# Patient Record
Sex: FEMALE | Race: WHITE | NOT HISPANIC OR LATINO | Employment: FULL TIME | URBAN - NONMETROPOLITAN AREA
[De-identification: names, ages, dates, MRNs, and addresses within clinical notes are randomized per-mention and may not be internally consistent; named-entity substitution may affect disease eponyms.]

---

## 2017-02-20 ENCOUNTER — OFFICE VISIT (OUTPATIENT)
Dept: URGENT CARE | Facility: PHYSICIAN GROUP | Age: 57
End: 2017-02-20
Payer: COMMERCIAL

## 2017-02-20 VITALS
DIASTOLIC BLOOD PRESSURE: 90 MMHG | HEIGHT: 65 IN | TEMPERATURE: 100.2 F | RESPIRATION RATE: 16 BRPM | SYSTOLIC BLOOD PRESSURE: 146 MMHG | HEART RATE: 88 BPM | WEIGHT: 270 LBS | BODY MASS INDEX: 44.98 KG/M2 | OXYGEN SATURATION: 95 %
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
## 2017-02-20 DIAGNOSIS — L03.818 CELLULITIS OF OTHER SPECIFIED SITE: ICD-10-CM

## 2017-02-20 DIAGNOSIS — S60.561A BUG BITE OF HAND, RIGHT, INITIAL ENCOUNTER: ICD-10-CM

## 2017-02-20 PROCEDURE — 99203 OFFICE O/P NEW LOW 30 MIN: CPT | Mod: 25 | Performed by: PHYSICIAN ASSISTANT

## 2017-02-20 PROCEDURE — G8419 CALC BMI OUT NRM PARAM NOF/U: HCPCS | Performed by: PHYSICIAN ASSISTANT

## 2017-02-20 PROCEDURE — G8484 FLU IMMUNIZE NO ADMIN: HCPCS | Performed by: PHYSICIAN ASSISTANT

## 2017-02-20 PROCEDURE — 1036F TOBACCO NON-USER: CPT | Performed by: PHYSICIAN ASSISTANT

## 2017-02-20 PROCEDURE — 3017F COLORECTAL CA SCREEN DOC REV: CPT | Mod: 8P | Performed by: PHYSICIAN ASSISTANT

## 2017-02-20 PROCEDURE — 3014F SCREEN MAMMO DOC REV: CPT | Mod: 8P | Performed by: PHYSICIAN ASSISTANT

## 2017-02-20 PROCEDURE — G8432 DEP SCR NOT DOC, RNG: HCPCS | Performed by: PHYSICIAN ASSISTANT

## 2017-02-20 RX ORDER — CEFTRIAXONE 1 G/1
1 INJECTION, POWDER, FOR SOLUTION INTRAMUSCULAR; INTRAVENOUS ONCE
Status: COMPLETED | OUTPATIENT
Start: 2017-02-20 | End: 2017-02-20

## 2017-02-20 RX ORDER — PAROXETINE HYDROCHLORIDE 20 MG/1
20 TABLET, FILM COATED ORAL DAILY
Status: ON HOLD | COMMUNITY
End: 2020-10-03

## 2017-02-20 RX ORDER — CEPHALEXIN 500 MG/1
500 CAPSULE ORAL 3 TIMES DAILY
Qty: 30 CAP | Refills: 0 | Status: SHIPPED | OUTPATIENT
Start: 2017-02-20 | End: 2017-03-02

## 2017-02-20 RX ADMIN — CEFTRIAXONE 1 G: 1 INJECTION, POWDER, FOR SOLUTION INTRAMUSCULAR; INTRAVENOUS at 18:18

## 2017-02-20 ASSESSMENT — ENCOUNTER SYMPTOMS
VOMITING: 0
FEVER: 0
CHANGE IN BOWEL HABIT: 0
EYE REDNESS: 0
DIZZINESS: 0
COUGH: 0
EYE DISCHARGE: 0
FALLS: 0
CHILLS: 0
MYALGIAS: 1
SORE THROAT: 0
NECK PAIN: 0
BACK PAIN: 0
ABDOMINAL PAIN: 0
VISUAL CHANGE: 0
TINGLING: 0

## 2017-02-20 NOTE — MR AVS SNAPSHOT
"        Terrie Scott   2017 5:45 PM   Office Visit   MRN: [id number]    Department:  [facility]   Dept Phone:  [phone]    Description:  Female : 1960   Provider:  Eladio Coello PA-C           Reason for Visit     Insect Bite R hand swollen X 2 days      Allergies as of 2017     No Known Allergies      You were diagnosed with     Bug bite of hand, right, initial encounter   [212909]       Cellulitis of other specified site   [0561774]         Vital Signs     Blood Pressure Pulse Temperature Respirations Height Weight    146/90 mmHg 88 37.9 °C (100.2 °F) 16 1.651 m (5' 5\") 122.471 kg (270 lb)    Body Mass Index Oxygen Saturation Breastfeeding? Smoking Status          44.93 kg/m2 95% No Never Smoker         Basic Information     Date Of Birth Sex Race Ethnicity Preferred Language    1960 Female Unable to Obtain Non- English      Problem List              ICD-10-CM Priority Class Noted - Resolved    HTN (hypertension) I10   2012 - Present    Fibromyalgia M79.7   2012 - Present    Arthritis M19.90   2012 - Present    Chronic fatigue disorder R53.82   2012 - Present      Health Maintenance        Date Due Completion Dates    IMM DTaP/Tdap/Td Vaccine (1 - Tdap) 1978 ---    PAP SMEAR 1980 ---    MAMMOGRAM 1999 ---    COLONOSCOPY 2009 ---    IMM INFLUENZA (1) 2016 ---            Current Immunizations     No immunizations on file.      Below and/or attached are the medications your provider expects you to take. Review all of your home medications and newly ordered medications with your provider and/or pharmacist. Follow medication instructions as directed by your provider and/or pharmacist. Please keep your medication list with you and share with your provider. Update the information when medications are discontinued, doses are changed, or new medications (including over-the-counter products) are added; and carry medication information " at all times in the event of emergency situations     Allergies:  No Known Allergies          Medications  Valid as of: February 20, 2017 -  6:32 PM    Generic Name Brand Name Tablet Size Instructions for use    Atenolol (Tab) TENORMIN 100 MG Take 100 mg by mouth every day.          Cephalexin (Cap) KEFLEX 500 MG Take 1 Cap by mouth 3 times a day for 10 days.        Cetirizine HCl (Tab) ZYRTEC 10 MG Take 1 Tab by mouth every day.        Citalopram Hydrobromide (Tab) CELEXA 20 MG Take 20 mg by mouth every day.          Famotidine (Tab) PEPCID 40 MG Take 1 Tab by mouth 2 times a day.        Hydrocodone-Acetaminophen (Tab) VICODIN 5-500 MG Take 1 Tab by mouth every 6 hours as needed.        Ketoconazole (Cream) NIZORAL 2 % To hand rash for 4 weeks        Losartan Potassium-HCTZ (Tab) HYZAAR 100-25 MG Take 1 Tab by mouth every day.          PARoxetine HCl (Tab) PAXIL 20 MG Take 20 mg by mouth every day.        .                 Medicines prescribed today were sent to:     [facility] - [facility] [address] [address]    Phone: [phone] Fax: [phone]    Open 24 Hours?: No    [facility] [address] [address]  AT [address] [address]    Phone: [phone] Fax: [phone]    Open 24 Hours?: No      Medication refill instructions:       If your prescription bottle indicates you have medication refills left, it is not necessary to call your provider’s office. Please contact your pharmacy and they will refill your medication.    If your prescription bottle indicates you do not have any refills left, you may request refills at any time through one of the following ways: The online SoundSen[employer]ation system (except Urgent Care), by calling your provider’s office, or by asking your pharmacy to contact your provider’s office with a refill request. Medication refills are processed only during regular business hours  and may not be available until the next business day. Your provider may request additional information or to have a follow-up visit with you prior to refilling your medication.   *Please Note: Medication refills are assigned a new Rx number when refilled electronically. Your pharmacy may indicate that no refills were authorized even though a new prescription for the same medication is available at the pharmacy. Please request the medicine by name with the pharmacy before contacting your provider for a refill.           ScholarPRO Access Code: [email or web address]  Expires: 3/22/2017  6:32 PM    Your email address is not on file at [facility].  Email Addresses are required for you to sign up for ScholarPRO, please contact [phone] to verify your personal information and to provide your email address prior to attempting to register for ScholarPRO.    Terrie Scott  [address]  [place] [address]    ScholarPRO  A secure, online tool to manage your health information     [facility]’s ScholarPRO® is a secure, online tool that connects you to your personalized health information from the privacy of your home -- day or night - making it very easy for you to manage your healthcare. Once the activation process is completed, you can even access your medical information using the ScholarPRO kishore, which is available for free in the Apple Kishore store or Google Play store.     To learn more about ScholarPRO, visit [email or web address]    There are two levels of access available (as shown below):   My Chart Features  [facility] Primary Care Doctor [facility]  Specialists [facility]  Urgent  Care Non-[facility] Primary Care Doctor   Email your healthcare team securely and privately 24/7 X X X    Manage appointments: schedule your next appointment; view details of past/upcoming appointments X      Request prescription refills. X      View recent personal medical records, including lab and immunizations X X X X   View health record, including health  history, allergies, medications X X X X   Read reports about your outpatient visits, procedures, consult and ER notes X X X X   See your discharge summary, which is a recap of your hospital and/or ER visit that includes your diagnosis, lab results, and care plan X X  X     How to register for Better Walk:  Once your e-mail address has been verified, follow the following steps to sign up for Better Walk.     1. Go to  [email or web address]  2. Click on the Sign Up Now box, which takes you to the New Member Sign Up page. You will need to provide the following information:  a. Enter your Better Walk Access Code exactly as it appears at the top of this page. (You will not need to use this code after you’ve completed the sign-up process. If you do not sign up before the expiration date, you must request a new code.)   b. Enter your date of birth.   c. Enter your home email address.   d. Click Submit, and follow the next screen’s instructions.  3. Create a [employer] Collection Systemst ID. This will be your Better Walk login ID and cannot be changed, so think of one that is secure and easy to remember.  4. Create a Better Walk password. You can change your password at any time.  5. Enter your Password Reset Question and Answer. This can be used at a later time if you forget your password.   6. Enter your e-mail address. This allows you to receive e-mail notifications when new information is available in Better Walk.  7. Click Sign Up. You can now view your health information.    For assistance activating your Better Walk account, call [phone]

## 2017-02-21 NOTE — PROGRESS NOTES
"Subjective:      Terrie Scott is a 57 y.o. female who presents with Insect Bite          Pt is 56 y/o female who presents with a possible bug bite to the 3rd digit of her right hand that she noticed this morning. She admits that she can't think of anything that might of bit her however she presumes it was a bug. He has a cat at home- however the cat is de-clawed and didn't bite her.   She denies fevers, chills. She reports hx of MS- and she always feels fatigued- however no new symptoms.   Animal Bite  This is a new problem. The current episode started today. The problem occurs constantly. The problem has been gradually worsening. Associated symptoms include myalgias. Pertinent negatives include no abdominal pain, change in bowel habit, chest pain, chills, coughing, fever, neck pain, rash, sore throat, urinary symptoms, visual change or vomiting. Exacerbated by: Movement. Treatments tried: Ice. Motrin. The treatment provided mild relief.       Review of Systems   Constitutional: Positive for malaise/fatigue. Negative for fever and chills.   HENT: Negative for ear discharge, ear pain and sore throat.    Eyes: Negative for discharge and redness.   Respiratory: Negative for cough.    Cardiovascular: Negative for chest pain and leg swelling.   Gastrointestinal: Negative for vomiting, abdominal pain and change in bowel habit.   Musculoskeletal: Positive for myalgias and joint pain. Negative for back pain, falls and neck pain.   Skin: Negative for itching and rash.   Neurological: Negative for dizziness and tingling.          Objective:     /90 mmHg  Pulse 88  Temp(Src) 37.9 °C (100.2 °F)  Resp 16  Ht 1.651 m (5' 5\")  Wt 122.471 kg (270 lb)  BMI 44.93 kg/m2  SpO2 95%  Breastfeeding? No   PMH:  has a past medical history of HTN (hypertension) (11/6/2012).  MEDS:   Current outpatient prescriptions:   •  paroxetine (PAXIL) 20 MG Tab, Take 20 mg by mouth every day., Disp: , Rfl:   •  cephALEXin (KEFLEX) " 500 MG Cap, Take 1 Cap by mouth 3 times a day for 10 days., Disp: 30 Cap, Rfl: 0  •  cetirizine (ZYRTEC) 10 MG TABS, Take 1 Tab by mouth every day., Disp: 30 Tab, Rfl: 3  •  losartan-hydrochlorothiazide (HYZAAR) 100-25 MG per tablet, Take 1 Tab by mouth every day.  , Disp: , Rfl:   •  famotidine (PEPCID) 40 MG TABS, Take 1 Tab by mouth 2 times a day., Disp: 180 Each, Rfl: 1  •  ketoconazole (NIZORAL) 2 % CREA, To hand rash for 4 weeks, Disp: 1 Tube, Rfl: 0  •  atenolol (TENORMIN) 100 MG TABS, Take 100 mg by mouth every day.  , Disp: , Rfl:   •  citalopram (CELEXA) 20 MG TABS, Take 20 mg by mouth every day.  , Disp: , Rfl:   •  hydrocodone-acetaminophen (VICODIN) 5-500 MG TABS, Take 1 Tab by mouth every 6 hours as needed., Disp: 120 Each, Rfl: 1  ALLERGIES: No Known Allergies  SURGHX: History reviewed. No pertinent past surgical history.  SOCHX:  reports that she has never smoked. She does not have any smokeless tobacco history on file. She reports that she does not drink alcohol or use illicit drugs.  FH: Family history was reviewed, no pertinent findings to report    Physical Exam   Constitutional: She is oriented to person, place, and time. She appears well-developed and well-nourished.   HENT:   Head: Normocephalic and atraumatic.   Eyes: EOM are normal. Pupils are equal, round, and reactive to light.   Neck: Normal range of motion. Neck supple.   Cardiovascular: Normal rate and regular rhythm.    Pulmonary/Chest: Effort normal and breath sounds normal.   Neurological: She is alert and oriented to person, place, and time.   Skin:        Just inferior to the PIP joint of the 3rd right digit- dorsum aspect of the hand- noted small papule- with surrounding edema, and erythema- extending down the hand- this was slightly warm to touch and tender. Slight decreased ROM with Flexion of the finger- without evidence of fluctuance or evidence of abscess formation. Without evidence of skin necrosis or FB.      Vitals  reviewed.              Assessment/Plan:     1. Bug bite of hand, right, initial encounter  - cefTRIAXone (ROCEPHIN) injection 1 g; 1,000 mg by Intramuscular route Once.  - cephALEXin (KEFLEX) 500 MG Cap; Take 1 Cap by mouth 3 times a day for 10 days.  Dispense: 30 Cap; Refill: 0    2. Cellulitis of other specified site  - cefTRIAXone (ROCEPHIN) injection 1 g; 1,000 mg by Intramuscular route Once.  - cephALEXin (KEFLEX) 500 MG Cap; Take 1 Cap by mouth 3 times a day for 10 days.  Dispense: 30 Cap; Refill: 0    Area was marked- for the pt. To have symptoms occur and worsening so quickly- STRICT ER precautions were discussed.   Use ice for swelling as tolerated- Wound check in 2 days.   Patient given precautionary s/sx that mandate immediate follow up and evaluation in the ED. Advised of risks of not doing so.    DDX, Supportive care, and indications for immediate follow-up discussed with patient.    Instructed to return to clinic or nearest emergency department if we are not available for any change in condition, further concerns, or worsening of symptoms.    The patient demonstrated a good understanding and agreed with the treatment plan.

## 2019-12-27 ENCOUNTER — HOSPITAL ENCOUNTER (OUTPATIENT)
Facility: MEDICAL CENTER | Age: 59
End: 2019-12-27
Attending: ORTHOPAEDIC SURGERY | Admitting: ORTHOPAEDIC SURGERY
Payer: COMMERCIAL

## 2020-10-02 ENCOUNTER — HOSPITAL ENCOUNTER (INPATIENT)
Facility: MEDICAL CENTER | Age: 60
LOS: 3 days | DRG: 418 | End: 2020-10-06
Attending: EMERGENCY MEDICINE | Admitting: HOSPITALIST
Payer: COMMERCIAL

## 2020-10-02 ENCOUNTER — APPOINTMENT (OUTPATIENT)
Dept: RADIOLOGY | Facility: MEDICAL CENTER | Age: 60
DRG: 418 | End: 2020-10-02
Attending: EMERGENCY MEDICINE
Payer: COMMERCIAL

## 2020-10-02 LAB
ALBUMIN SERPL BCP-MCNC: 4 G/DL (ref 3.2–4.9)
ALBUMIN/GLOB SERPL: 1.3 G/DL
ALP SERPL-CCNC: 83 U/L (ref 30–99)
ALT SERPL-CCNC: 19 U/L (ref 2–50)
ANION GAP SERPL CALC-SCNC: 12 MMOL/L (ref 7–16)
AST SERPL-CCNC: 25 U/L (ref 12–45)
BASOPHILS # BLD AUTO: 1 % (ref 0–1.8)
BASOPHILS # BLD: 0.07 K/UL (ref 0–0.12)
BILIRUB SERPL-MCNC: 0.3 MG/DL (ref 0.1–1.5)
BUN SERPL-MCNC: 14 MG/DL (ref 8–22)
CALCIUM SERPL-MCNC: 9.1 MG/DL (ref 8.5–10.5)
CHLORIDE SERPL-SCNC: 101 MMOL/L (ref 96–112)
CO2 SERPL-SCNC: 26 MMOL/L (ref 20–33)
COVID ORDER STATUS COVID19: NORMAL
CREAT SERPL-MCNC: 1.11 MG/DL (ref 0.5–1.4)
EOSINOPHIL # BLD AUTO: 0.28 K/UL (ref 0–0.51)
EOSINOPHIL NFR BLD: 4.2 % (ref 0–6.9)
ERYTHROCYTE [DISTWIDTH] IN BLOOD BY AUTOMATED COUNT: 44.4 FL (ref 35.9–50)
GLOBULIN SER CALC-MCNC: 3.1 G/DL (ref 1.9–3.5)
GLUCOSE SERPL-MCNC: 104 MG/DL (ref 65–99)
HCT VFR BLD AUTO: 41.5 % (ref 37–47)
HGB BLD-MCNC: 13.4 G/DL (ref 12–16)
IMM GRANULOCYTES # BLD AUTO: 0.01 K/UL (ref 0–0.11)
IMM GRANULOCYTES NFR BLD AUTO: 0.1 % (ref 0–0.9)
LIPASE SERPL-CCNC: 461 U/L (ref 11–82)
LYMPHOCYTES # BLD AUTO: 2.78 K/UL (ref 1–4.8)
LYMPHOCYTES NFR BLD: 41.6 % (ref 22–41)
MCH RBC QN AUTO: 29.5 PG (ref 27–33)
MCHC RBC AUTO-ENTMCNC: 32.3 G/DL (ref 33.6–35)
MCV RBC AUTO: 91.2 FL (ref 81.4–97.8)
MONOCYTES # BLD AUTO: 0.71 K/UL (ref 0–0.85)
MONOCYTES NFR BLD AUTO: 10.6 % (ref 0–13.4)
NEUTROPHILS # BLD AUTO: 2.84 K/UL (ref 2–7.15)
NEUTROPHILS NFR BLD: 42.5 % (ref 44–72)
NRBC # BLD AUTO: 0 K/UL
NRBC BLD-RTO: 0 /100 WBC
PLATELET # BLD AUTO: 159 K/UL (ref 164–446)
PMV BLD AUTO: 10.5 FL (ref 9–12.9)
POTASSIUM SERPL-SCNC: 3.9 MMOL/L (ref 3.6–5.5)
PROT SERPL-MCNC: 7.1 G/DL (ref 6–8.2)
RBC # BLD AUTO: 4.55 M/UL (ref 4.2–5.4)
SODIUM SERPL-SCNC: 139 MMOL/L (ref 135–145)
WBC # BLD AUTO: 6.7 K/UL (ref 4.8–10.8)

## 2020-10-02 PROCEDURE — 83690 ASSAY OF LIPASE: CPT

## 2020-10-02 PROCEDURE — 76705 ECHO EXAM OF ABDOMEN: CPT

## 2020-10-02 PROCEDURE — 85025 COMPLETE CBC W/AUTO DIFF WBC: CPT

## 2020-10-02 PROCEDURE — 36415 COLL VENOUS BLD VENIPUNCTURE: CPT

## 2020-10-02 PROCEDURE — 700105 HCHG RX REV CODE 258: Performed by: EMERGENCY MEDICINE

## 2020-10-02 PROCEDURE — 80053 COMPREHEN METABOLIC PANEL: CPT

## 2020-10-02 PROCEDURE — 96374 THER/PROPH/DIAG INJ IV PUSH: CPT

## 2020-10-02 PROCEDURE — 700111 HCHG RX REV CODE 636 W/ 250 OVERRIDE (IP): Performed by: EMERGENCY MEDICINE

## 2020-10-02 PROCEDURE — 96375 TX/PRO/DX INJ NEW DRUG ADDON: CPT

## 2020-10-02 PROCEDURE — U0003 INFECTIOUS AGENT DETECTION BY NUCLEIC ACID (DNA OR RNA); SEVERE ACUTE RESPIRATORY SYNDROME CORONAVIRUS 2 (SARS-COV-2) (CORONAVIRUS DISEASE [COVID-19]), AMPLIFIED PROBE TECHNIQUE, MAKING USE OF HIGH THROUGHPUT TECHNOLOGIES AS DESCRIBED BY CMS-2020-01-R: HCPCS

## 2020-10-02 PROCEDURE — 99285 EMERGENCY DEPT VISIT HI MDM: CPT

## 2020-10-02 PROCEDURE — C9803 HOPD COVID-19 SPEC COLLECT: HCPCS | Performed by: EMERGENCY MEDICINE

## 2020-10-02 RX ORDER — ONDANSETRON 2 MG/ML
4 INJECTION INTRAMUSCULAR; INTRAVENOUS ONCE
Status: COMPLETED | OUTPATIENT
Start: 2020-10-02 | End: 2020-10-02

## 2020-10-02 RX ORDER — MORPHINE SULFATE 4 MG/ML
4 INJECTION, SOLUTION INTRAMUSCULAR; INTRAVENOUS ONCE
Status: COMPLETED | OUTPATIENT
Start: 2020-10-02 | End: 2020-10-02

## 2020-10-02 RX ORDER — SODIUM CHLORIDE 9 MG/ML
1000 INJECTION, SOLUTION INTRAVENOUS ONCE
Status: COMPLETED | OUTPATIENT
Start: 2020-10-02 | End: 2020-10-02

## 2020-10-02 RX ADMIN — SODIUM CHLORIDE 1000 ML: 9 INJECTION, SOLUTION INTRAVENOUS at 23:24

## 2020-10-02 RX ADMIN — ONDANSETRON 4 MG: 2 INJECTION INTRAMUSCULAR; INTRAVENOUS at 23:27

## 2020-10-02 RX ADMIN — MORPHINE SULFATE 4 MG: 4 INJECTION INTRAVENOUS at 23:27

## 2020-10-03 ENCOUNTER — ANESTHESIA (OUTPATIENT)
Dept: SURGERY | Facility: MEDICAL CENTER | Age: 60
DRG: 418 | End: 2020-10-03
Payer: COMMERCIAL

## 2020-10-03 ENCOUNTER — ANESTHESIA EVENT (OUTPATIENT)
Dept: SURGERY | Facility: MEDICAL CENTER | Age: 60
DRG: 418 | End: 2020-10-03
Payer: COMMERCIAL

## 2020-10-03 PROBLEM — K85.10 GALLSTONE PANCREATITIS: Status: ACTIVE | Noted: 2020-10-03

## 2020-10-03 PROBLEM — K80.20 CHOLELITHIASES: Status: ACTIVE | Noted: 2020-10-03

## 2020-10-03 LAB
LIPASE SERPL-CCNC: 62 U/L (ref 11–82)
SARS-COV-2 RNA RESP QL NAA+PROBE: NOTDETECTED
SPECIMEN SOURCE: NORMAL

## 2020-10-03 PROCEDURE — 160002 HCHG RECOVERY MINUTES (STAT): Performed by: SURGERY

## 2020-10-03 PROCEDURE — A9270 NON-COVERED ITEM OR SERVICE: HCPCS | Performed by: ANESTHESIOLOGY

## 2020-10-03 PROCEDURE — 700102 HCHG RX REV CODE 250 W/ 637 OVERRIDE(OP): Performed by: HOSPITALIST

## 2020-10-03 PROCEDURE — A9270 NON-COVERED ITEM OR SERVICE: HCPCS | Performed by: HOSPITALIST

## 2020-10-03 PROCEDURE — 501838 HCHG SUTURE GENERAL: Performed by: SURGERY

## 2020-10-03 PROCEDURE — 502571 HCHG PACK, LAP CHOLE: Performed by: SURGERY

## 2020-10-03 PROCEDURE — 700105 HCHG RX REV CODE 258: Performed by: HOSPITALIST

## 2020-10-03 PROCEDURE — 501583 HCHG TROCAR, THRD CAN&SEAL 5X100: Performed by: SURGERY

## 2020-10-03 PROCEDURE — 501399 HCHG SPECIMAN BAG, ENDO CATC: Performed by: SURGERY

## 2020-10-03 PROCEDURE — 501338 HCHG SHEARS, ENDO: Performed by: SURGERY

## 2020-10-03 PROCEDURE — 501572 HCHG TROCAR, SHIELD OBTU 5X100: Performed by: SURGERY

## 2020-10-03 PROCEDURE — 700105 HCHG RX REV CODE 258: Performed by: ANESTHESIOLOGY

## 2020-10-03 PROCEDURE — 36415 COLL VENOUS BLD VENIPUNCTURE: CPT

## 2020-10-03 PROCEDURE — 0FT44ZZ RESECTION OF GALLBLADDER, PERCUTANEOUS ENDOSCOPIC APPROACH: ICD-10-PCS | Performed by: SURGERY

## 2020-10-03 PROCEDURE — 99233 SBSQ HOSP IP/OBS HIGH 50: CPT | Performed by: STUDENT IN AN ORGANIZED HEALTH CARE EDUCATION/TRAINING PROGRAM

## 2020-10-03 PROCEDURE — 99223 1ST HOSP IP/OBS HIGH 75: CPT | Mod: AI | Performed by: HOSPITALIST

## 2020-10-03 PROCEDURE — 83690 ASSAY OF LIPASE: CPT

## 2020-10-03 PROCEDURE — 700101 HCHG RX REV CODE 250: Performed by: ANESTHESIOLOGY

## 2020-10-03 PROCEDURE — 700111 HCHG RX REV CODE 636 W/ 250 OVERRIDE (IP): Performed by: ANESTHESIOLOGY

## 2020-10-03 PROCEDURE — 160029 HCHG SURGERY MINUTES - 1ST 30 MINS LEVEL 4: Performed by: SURGERY

## 2020-10-03 PROCEDURE — 160035 HCHG PACU - 1ST 60 MINS PHASE I: Performed by: SURGERY

## 2020-10-03 PROCEDURE — 160041 HCHG SURGERY MINUTES - EA ADDL 1 MIN LEVEL 4: Performed by: SURGERY

## 2020-10-03 PROCEDURE — 700111 HCHG RX REV CODE 636 W/ 250 OVERRIDE (IP): Performed by: HOSPITALIST

## 2020-10-03 PROCEDURE — 160048 HCHG OR STATISTICAL LEVEL 1-5: Performed by: SURGERY

## 2020-10-03 PROCEDURE — 88304 TISSUE EXAM BY PATHOLOGIST: CPT

## 2020-10-03 PROCEDURE — 501582 HCHG TROCAR, THRD BLADED: Performed by: SURGERY

## 2020-10-03 PROCEDURE — 700102 HCHG RX REV CODE 250 W/ 637 OVERRIDE(OP): Performed by: ANESTHESIOLOGY

## 2020-10-03 PROCEDURE — 770006 HCHG ROOM/CARE - MED/SURG/GYN SEMI*

## 2020-10-03 PROCEDURE — 501568 HCHG TROCAR, BLUNTPORT 12MM: Performed by: SURGERY

## 2020-10-03 PROCEDURE — 160009 HCHG ANES TIME/MIN: Performed by: SURGERY

## 2020-10-03 RX ORDER — ACETAMINOPHEN 325 MG/1
650 TABLET ORAL EVERY 6 HOURS PRN
Status: DISCONTINUED | OUTPATIENT
Start: 2020-10-03 | End: 2020-10-06 | Stop reason: HOSPADM

## 2020-10-03 RX ORDER — ONDANSETRON 2 MG/ML
4 INJECTION INTRAMUSCULAR; INTRAVENOUS
Status: COMPLETED | OUTPATIENT
Start: 2020-10-03 | End: 2020-10-03

## 2020-10-03 RX ORDER — POLYETHYLENE GLYCOL 3350 17 G/17G
1 POWDER, FOR SOLUTION ORAL
Status: DISCONTINUED | OUTPATIENT
Start: 2020-10-03 | End: 2020-10-05

## 2020-10-03 RX ORDER — DIPHENHYDRAMINE HYDROCHLORIDE 50 MG/ML
12.5 INJECTION INTRAMUSCULAR; INTRAVENOUS
Status: DISCONTINUED | OUTPATIENT
Start: 2020-10-03 | End: 2020-10-03 | Stop reason: HOSPADM

## 2020-10-03 RX ORDER — OXYCODONE HCL 5 MG/5 ML
10 SOLUTION, ORAL ORAL
Status: COMPLETED | OUTPATIENT
Start: 2020-10-03 | End: 2020-10-03

## 2020-10-03 RX ORDER — HYDROCHLOROTHIAZIDE 25 MG/1
25 TABLET ORAL DAILY
COMMUNITY

## 2020-10-03 RX ORDER — ONDANSETRON 4 MG/1
4 TABLET, ORALLY DISINTEGRATING ORAL EVERY 4 HOURS PRN
Status: DISCONTINUED | OUTPATIENT
Start: 2020-10-03 | End: 2020-10-06 | Stop reason: HOSPADM

## 2020-10-03 RX ORDER — KETOROLAC TROMETHAMINE 30 MG/ML
INJECTION, SOLUTION INTRAMUSCULAR; INTRAVENOUS PRN
Status: DISCONTINUED | OUTPATIENT
Start: 2020-10-03 | End: 2020-10-03 | Stop reason: SURG

## 2020-10-03 RX ORDER — CEFOTETAN DISODIUM 2 G/20ML
INJECTION, POWDER, FOR SOLUTION INTRAMUSCULAR; INTRAVENOUS PRN
Status: DISCONTINUED | OUTPATIENT
Start: 2020-10-03 | End: 2020-10-03 | Stop reason: SURG

## 2020-10-03 RX ORDER — HALOPERIDOL 5 MG/ML
1 INJECTION INTRAMUSCULAR
Status: DISCONTINUED | OUTPATIENT
Start: 2020-10-03 | End: 2020-10-03 | Stop reason: HOSPADM

## 2020-10-03 RX ORDER — PAROXETINE HYDROCHLORIDE 20 MG/1
20 TABLET, FILM COATED ORAL DAILY
Status: DISCONTINUED | OUTPATIENT
Start: 2020-10-03 | End: 2020-10-04

## 2020-10-03 RX ORDER — OXYCODONE HCL 5 MG/5 ML
5 SOLUTION, ORAL ORAL
Status: COMPLETED | OUTPATIENT
Start: 2020-10-03 | End: 2020-10-03

## 2020-10-03 RX ORDER — SODIUM CHLORIDE, SODIUM LACTATE, POTASSIUM CHLORIDE, CALCIUM CHLORIDE 600; 310; 30; 20 MG/100ML; MG/100ML; MG/100ML; MG/100ML
INJECTION, SOLUTION INTRAVENOUS CONTINUOUS
Status: DISCONTINUED | OUTPATIENT
Start: 2020-10-03 | End: 2020-10-03 | Stop reason: HOSPADM

## 2020-10-03 RX ORDER — CITALOPRAM 20 MG/1
20 TABLET ORAL DAILY
Status: DISCONTINUED | OUTPATIENT
Start: 2020-10-03 | End: 2020-10-04

## 2020-10-03 RX ORDER — MIDAZOLAM HYDROCHLORIDE 1 MG/ML
1 INJECTION INTRAMUSCULAR; INTRAVENOUS
Status: DISCONTINUED | OUTPATIENT
Start: 2020-10-03 | End: 2020-10-03 | Stop reason: HOSPADM

## 2020-10-03 RX ORDER — LOSARTAN POTASSIUM 50 MG/1
100 TABLET ORAL DAILY
COMMUNITY

## 2020-10-03 RX ORDER — OXYCODONE HYDROCHLORIDE 5 MG/1
5 TABLET ORAL EVERY 4 HOURS PRN
Status: DISCONTINUED | OUTPATIENT
Start: 2020-10-03 | End: 2020-10-06 | Stop reason: HOSPADM

## 2020-10-03 RX ORDER — ALPRAZOLAM 0.25 MG/1
0.25 TABLET ORAL
COMMUNITY

## 2020-10-03 RX ORDER — MEPERIDINE HYDROCHLORIDE 25 MG/ML
INJECTION INTRAMUSCULAR; INTRAVENOUS; SUBCUTANEOUS PRN
Status: DISCONTINUED | OUTPATIENT
Start: 2020-10-03 | End: 2020-10-03 | Stop reason: SURG

## 2020-10-03 RX ORDER — ONDANSETRON 2 MG/ML
INJECTION INTRAMUSCULAR; INTRAVENOUS PRN
Status: DISCONTINUED | OUTPATIENT
Start: 2020-10-03 | End: 2020-10-03 | Stop reason: SURG

## 2020-10-03 RX ORDER — SODIUM CHLORIDE, SODIUM LACTATE, POTASSIUM CHLORIDE, CALCIUM CHLORIDE 600; 310; 30; 20 MG/100ML; MG/100ML; MG/100ML; MG/100ML
INJECTION, SOLUTION INTRAVENOUS
Status: DISCONTINUED | OUTPATIENT
Start: 2020-10-03 | End: 2020-10-03 | Stop reason: SURG

## 2020-10-03 RX ORDER — SUCCINYLCHOLINE/SOD CL,ISO/PF 200MG/10ML
SYRINGE (ML) INTRAVENOUS PRN
Status: DISCONTINUED | OUTPATIENT
Start: 2020-10-03 | End: 2020-10-03 | Stop reason: SURG

## 2020-10-03 RX ORDER — ATENOLOL 25 MG/1
100 TABLET ORAL DAILY
Status: DISCONTINUED | OUTPATIENT
Start: 2020-10-03 | End: 2020-10-04

## 2020-10-03 RX ORDER — MEPERIDINE HYDROCHLORIDE 25 MG/ML
25 INJECTION INTRAMUSCULAR; INTRAVENOUS; SUBCUTANEOUS
Status: DISCONTINUED | OUTPATIENT
Start: 2020-10-03 | End: 2020-10-03 | Stop reason: HOSPADM

## 2020-10-03 RX ORDER — PROCHLORPERAZINE EDISYLATE 5 MG/ML
5-10 INJECTION INTRAMUSCULAR; INTRAVENOUS EVERY 4 HOURS PRN
Status: DISCONTINUED | OUTPATIENT
Start: 2020-10-03 | End: 2020-10-06 | Stop reason: HOSPADM

## 2020-10-03 RX ORDER — LOSARTAN POTASSIUM AND HYDROCHLOROTHIAZIDE 25; 100 MG/1; MG/1
1 TABLET ORAL DAILY
Status: DISCONTINUED | OUTPATIENT
Start: 2020-10-03 | End: 2020-10-03

## 2020-10-03 RX ORDER — LOSARTAN POTASSIUM 50 MG/1
100 TABLET ORAL
Status: DISCONTINUED | OUTPATIENT
Start: 2020-10-03 | End: 2020-10-06 | Stop reason: HOSPADM

## 2020-10-03 RX ORDER — PROMETHAZINE HYDROCHLORIDE 25 MG/1
12.5-25 TABLET ORAL EVERY 4 HOURS PRN
Status: DISCONTINUED | OUTPATIENT
Start: 2020-10-03 | End: 2020-10-06 | Stop reason: HOSPADM

## 2020-10-03 RX ORDER — ROCURONIUM BROMIDE 10 MG/ML
INJECTION, SOLUTION INTRAVENOUS PRN
Status: DISCONTINUED | OUTPATIENT
Start: 2020-10-03 | End: 2020-10-03 | Stop reason: SURG

## 2020-10-03 RX ORDER — DEXAMETHASONE SODIUM PHOSPHATE 4 MG/ML
INJECTION, SOLUTION INTRA-ARTICULAR; INTRALESIONAL; INTRAMUSCULAR; INTRAVENOUS; SOFT TISSUE PRN
Status: DISCONTINUED | OUTPATIENT
Start: 2020-10-03 | End: 2020-10-03 | Stop reason: SURG

## 2020-10-03 RX ORDER — NEOSTIGMINE METHYLSULFATE 1 MG/ML
INJECTION, SOLUTION INTRAVENOUS PRN
Status: DISCONTINUED | OUTPATIENT
Start: 2020-10-03 | End: 2020-10-03 | Stop reason: SURG

## 2020-10-03 RX ORDER — SODIUM CHLORIDE 9 MG/ML
INJECTION, SOLUTION INTRAVENOUS CONTINUOUS
Status: DISCONTINUED | OUTPATIENT
Start: 2020-10-03 | End: 2020-10-05

## 2020-10-03 RX ORDER — LABETALOL HYDROCHLORIDE 5 MG/ML
5 INJECTION, SOLUTION INTRAVENOUS
Status: DISCONTINUED | OUTPATIENT
Start: 2020-10-03 | End: 2020-10-03 | Stop reason: HOSPADM

## 2020-10-03 RX ORDER — GLYCOPYRROLATE 0.2 MG/ML
INJECTION INTRAMUSCULAR; INTRAVENOUS PRN
Status: DISCONTINUED | OUTPATIENT
Start: 2020-10-03 | End: 2020-10-03 | Stop reason: SURG

## 2020-10-03 RX ORDER — BISACODYL 10 MG
10 SUPPOSITORY, RECTAL RECTAL
Status: DISCONTINUED | OUTPATIENT
Start: 2020-10-03 | End: 2020-10-05

## 2020-10-03 RX ORDER — AMOXICILLIN 250 MG
2 CAPSULE ORAL 2 TIMES DAILY
Status: DISCONTINUED | OUTPATIENT
Start: 2020-10-03 | End: 2020-10-05

## 2020-10-03 RX ORDER — PROMETHAZINE HYDROCHLORIDE 25 MG/1
12.5-25 SUPPOSITORY RECTAL EVERY 4 HOURS PRN
Status: DISCONTINUED | OUTPATIENT
Start: 2020-10-03 | End: 2020-10-06 | Stop reason: HOSPADM

## 2020-10-03 RX ORDER — OXYBUTYNIN CHLORIDE 10 MG/1
10 TABLET, EXTENDED RELEASE ORAL DAILY
COMMUNITY

## 2020-10-03 RX ORDER — MORPHINE SULFATE 4 MG/ML
2 INJECTION, SOLUTION INTRAMUSCULAR; INTRAVENOUS
Status: DISCONTINUED | OUTPATIENT
Start: 2020-10-03 | End: 2020-10-06 | Stop reason: HOSPADM

## 2020-10-03 RX ORDER — HYDROCHLOROTHIAZIDE 25 MG/1
25 TABLET ORAL
Status: DISCONTINUED | OUTPATIENT
Start: 2020-10-03 | End: 2020-10-06 | Stop reason: HOSPADM

## 2020-10-03 RX ORDER — ONDANSETRON 2 MG/ML
4 INJECTION INTRAMUSCULAR; INTRAVENOUS EVERY 4 HOURS PRN
Status: DISCONTINUED | OUTPATIENT
Start: 2020-10-03 | End: 2020-10-06 | Stop reason: HOSPADM

## 2020-10-03 RX ADMIN — ROCURONIUM BROMIDE 10 MG: 10 INJECTION, SOLUTION INTRAVENOUS at 16:25

## 2020-10-03 RX ADMIN — OXYCODONE HYDROCHLORIDE 10 MG: 5 SOLUTION ORAL at 17:08

## 2020-10-03 RX ADMIN — ROCURONIUM BROMIDE 25 MG: 10 INJECTION, SOLUTION INTRAVENOUS at 16:13

## 2020-10-03 RX ADMIN — CEFOTETAN DISODIUM 2 G: 2 INJECTION, POWDER, FOR SOLUTION INTRAMUSCULAR; INTRAVENOUS at 16:02

## 2020-10-03 RX ADMIN — PROMETHAZINE HYDROCHLORIDE 12.5 MG: 25 TABLET ORAL at 19:58

## 2020-10-03 RX ADMIN — NEOSTIGMINE METHYLSULFATE 3 MG: 1 INJECTION INTRAVENOUS at 16:50

## 2020-10-03 RX ADMIN — ONDANSETRON 4 MG: 2 INJECTION INTRAMUSCULAR; INTRAVENOUS at 08:47

## 2020-10-03 RX ADMIN — GLYCOPYRROLATE 0.5 MG: 0.2 INJECTION INTRAMUSCULAR; INTRAVENOUS at 16:50

## 2020-10-03 RX ADMIN — KETOROLAC TROMETHAMINE 30 MG: 30 INJECTION, SOLUTION INTRAMUSCULAR at 16:31

## 2020-10-03 RX ADMIN — PROPOFOL 30 MG: 10 INJECTION, EMULSION INTRAVENOUS at 16:49

## 2020-10-03 RX ADMIN — MEPERIDINE HYDROCHLORIDE 25 MG: 25 INJECTION INTRAMUSCULAR; INTRAVENOUS; SUBCUTANEOUS at 16:41

## 2020-10-03 RX ADMIN — SODIUM CHLORIDE, POTASSIUM CHLORIDE, SODIUM LACTATE AND CALCIUM CHLORIDE: 600; 310; 30; 20 INJECTION, SOLUTION INTRAVENOUS at 16:02

## 2020-10-03 RX ADMIN — PROPOFOL 40 MG: 10 INJECTION, EMULSION INTRAVENOUS at 16:06

## 2020-10-03 RX ADMIN — ROCURONIUM BROMIDE 5 MG: 10 INJECTION, SOLUTION INTRAVENOUS at 16:05

## 2020-10-03 RX ADMIN — PROPOFOL 30 MG: 10 INJECTION, EMULSION INTRAVENOUS at 16:44

## 2020-10-03 RX ADMIN — Medication 160 MG: at 16:06

## 2020-10-03 RX ADMIN — ONDANSETRON 4 MG: 2 INJECTION INTRAMUSCULAR; INTRAVENOUS at 16:41

## 2020-10-03 RX ADMIN — PROPOFOL 20 MG: 10 INJECTION, EMULSION INTRAVENOUS at 16:05

## 2020-10-03 RX ADMIN — DEXAMETHASONE SODIUM PHOSPHATE 8 MG: 4 INJECTION, SOLUTION INTRA-ARTICULAR; INTRALESIONAL; INTRAMUSCULAR; INTRAVENOUS; SOFT TISSUE at 16:10

## 2020-10-03 RX ADMIN — SODIUM CHLORIDE: 9 INJECTION, SOLUTION INTRAVENOUS at 02:20

## 2020-10-03 RX ADMIN — HALOPERIDOL LACTATE 1 MG: 5 INJECTION, SOLUTION INTRAMUSCULAR at 18:09

## 2020-10-03 RX ADMIN — MORPHINE SULFATE 2 MG: 4 INJECTION INTRAVENOUS at 02:30

## 2020-10-03 RX ADMIN — ACETAMINOPHEN 650 MG: 325 TABLET, FILM COATED ORAL at 10:25

## 2020-10-03 RX ADMIN — ALFENTANIL HYDROCHLORIDE 1000 MCG: 500 INJECTION INTRAVENOUS at 16:05

## 2020-10-03 RX ADMIN — MORPHINE SULFATE 2 MG: 4 INJECTION INTRAVENOUS at 20:00

## 2020-10-03 RX ADMIN — MORPHINE SULFATE 2 MG: 4 INJECTION INTRAVENOUS at 08:47

## 2020-10-03 RX ADMIN — ONDANSETRON 4 MG: 2 INJECTION INTRAMUSCULAR; INTRAVENOUS at 18:06

## 2020-10-03 RX ADMIN — ONDANSETRON 4 MG: 2 INJECTION INTRAMUSCULAR; INTRAVENOUS at 02:45

## 2020-10-03 ASSESSMENT — COGNITIVE AND FUNCTIONAL STATUS - GENERAL
SUGGESTED CMS G CODE MODIFIER DAILY ACTIVITY: CH
SUGGESTED CMS G CODE MODIFIER MOBILITY: CH
DAILY ACTIVITIY SCORE: 24
MOBILITY SCORE: 24

## 2020-10-03 ASSESSMENT — ENCOUNTER SYMPTOMS
FOCAL WEAKNESS: 0
BLURRED VISION: 0
PALPITATIONS: 0
SORE THROAT: 0
SHORTNESS OF BREATH: 0
DIARRHEA: 0
VOMITING: 1
COUGH: 0
DOUBLE VISION: 0
BACK PAIN: 1
DIZZINESS: 0
CHILLS: 0
NAUSEA: 1
VOMITING: 0
HEARTBURN: 1
NECK PAIN: 0
FEVER: 0
BRUISES/BLEEDS EASILY: 0
ABDOMINAL PAIN: 1
WEAKNESS: 0
INSOMNIA: 0
MYALGIAS: 0
CONSTIPATION: 1
BLOOD IN STOOL: 0
HEADACHES: 0
DEPRESSION: 0

## 2020-10-03 ASSESSMENT — PATIENT HEALTH QUESTIONNAIRE - PHQ9
2. FEELING DOWN, DEPRESSED, IRRITABLE, OR HOPELESS: NOT AT ALL
SUM OF ALL RESPONSES TO PHQ9 QUESTIONS 1 AND 2: 0
1. LITTLE INTEREST OR PLEASURE IN DOING THINGS: NOT AT ALL

## 2020-10-03 ASSESSMENT — PAIN DESCRIPTION - PAIN TYPE
TYPE: ACUTE PAIN
TYPE: ACUTE PAIN;SURGICAL PAIN
TYPE: ACUTE PAIN
TYPE: SURGICAL PAIN
TYPE: ACUTE PAIN
TYPE: ACUTE PAIN;SURGICAL PAIN
TYPE: SURGICAL PAIN
TYPE: ACUTE PAIN
TYPE: ACUTE PAIN

## 2020-10-03 ASSESSMENT — LIFESTYLE VARIABLES
TOTAL SCORE: 0
TOTAL SCORE: 0
HOW MANY TIMES IN THE PAST YEAR HAVE YOU HAD 5 OR MORE DRINKS IN A DAY: 0
TOTAL SCORE: 0
EVER FELT BAD OR GUILTY ABOUT YOUR DRINKING: NO
ON A TYPICAL DAY WHEN YOU DRINK ALCOHOL HOW MANY DRINKS DO YOU HAVE: 0
DOES PATIENT WANT TO STOP DRINKING: NO
ALCOHOL_USE: NO
EVER HAD A DRINK FIRST THING IN THE MORNING TO STEADY YOUR NERVES TO GET RID OF A HANGOVER: NO
AVERAGE NUMBER OF DAYS PER WEEK YOU HAVE A DRINK CONTAINING ALCOHOL: 0
SUBSTANCE_ABUSE: 0
HAVE PEOPLE ANNOYED YOU BY CRITICIZING YOUR DRINKING: NO
HAVE YOU EVER FELT YOU SHOULD CUT DOWN ON YOUR DRINKING: NO
CONSUMPTION TOTAL: NEGATIVE

## 2020-10-03 ASSESSMENT — FIBROSIS 4 INDEX
FIB4 SCORE: 3.01
FIB4 SCORE: 2.2

## 2020-10-03 ASSESSMENT — PAIN SCALES - GENERAL: PAIN_LEVEL: 0

## 2020-10-03 NOTE — H&P
"Hospital Medicine History & Physical Note    Date of Service  10/3/2020    Primary Care Physician  No primary care provider on file.    Consultants  Please call General Surgery Consult in am    Code Status  Full Code    Chief Complaint  Chief Complaint   Patient presents with   • Abdominal Pain     pt is transfer from [facility] pt went in for abdominal painx 10 days. Pt has 3cm stone in CBD. pt sent here for ERCP       History of Presenting Illness  60 y.o. female with history of hypertension and fibromyalgia who is coming as a direct transfer ER to ER from [facility] on 10/2/2020, with possible obstructive stone requiring ERCP.    Patient is having abdominal pain for about 10 days now.  She was in [place] when pain started and went to [facility] where she was admitted for \"possible obstruction \"need a benign emergency cholecystectomy.  She was told it would take a few days    Review of Systems  Review of Systems   Constitutional: Negative for fever.   HENT: Negative for congestion and sore throat.    Eyes: Negative for blurred vision and double vision.   Respiratory: Negative for cough and shortness of breath.    Cardiovascular: Negative for chest pain and palpitations.   Gastrointestinal: Positive for abdominal pain, nausea and vomiting.   Genitourinary: Negative for dysuria and urgency.   Musculoskeletal: Negative for myalgias and neck pain.   Skin: Negative for itching and rash.   Neurological: Negative for dizziness, weakness and headaches.   Endo/Heme/Allergies: Does not bruise/bleed easily.   Psychiatric/Behavioral: Negative for depression. The patient does not have insomnia.        Past Medical History   has a past medical history of HTN (hypertension) (11/6/2012).    Surgical History   has no past surgical history on file.     Family History  family history includes Diabetes in her father and mother; Heart Attack in her father and mother; Hyperlipidemia in her father and mother; Hypertension " in her father and mother.     Social History   reports that she has never smoked. She does not have any smokeless tobacco history on file. She reports that she does not drink alcohol or use drugs.    Allergies  No Known Allergies    Medications  Prior to Admission Medications   Prescriptions Last Dose Informant Patient Reported? Taking?   atenolol (TENORMIN) 100 MG TABS   Yes No   Sig: Take 100 mg by mouth every day.     cetirizine (ZYRTEC) 10 MG TABS   Yes No   Sig: Take 1 Tab by mouth every day.   citalopram (CELEXA) 20 MG TABS   Yes No   Sig: Take 20 mg by mouth every day.     famotidine (PEPCID) 40 MG TABS   No No   Sig: Take 1 Tab by mouth 2 times a day.   hydrocodone-acetaminophen (VICODIN) 5-500 MG TABS   No No   Sig: Take 1 Tab by mouth every 6 hours as needed.   ketoconazole (NIZORAL) 2 % CREA   No No   Sig: To hand rash for 4 weeks   losartan-hydrochlorothiazide (HYZAAR) 100-25 MG per tablet   Yes No   Sig: Take 1 Tab by mouth every day.     paroxetine (PAXIL) 20 MG Tab   Yes No   Sig: Take 20 mg by mouth every day.      Facility-Administered Medications: None       Physical Exam  Temp:  [36.3 °C (97.3 °F)] 36.3 °C (97.3 °F)  Pulse:  [69-78] 69  Resp:  [15-16] 15  BP: (164-202)/(93-98) 164/93  SpO2:  [93 %-98 %] 93 %    Physical Exam  Constitutional:       Appearance: Normal appearance.   HENT:      Head: Normocephalic and atraumatic.      Mouth/Throat:      Mouth: Mucous membranes are moist.      Pharynx: Oropharynx is clear.   Eyes:      Extraocular Movements: Extraocular movements intact.      Pupils: Pupils are equal, round, and reactive to light.   Neck:      Musculoskeletal: Normal range of motion and neck supple.   Cardiovascular:      Rate and Rhythm: Normal rate and regular rhythm.      Heart sounds: Normal heart sounds.   Pulmonary:      Effort: Pulmonary effort is normal.      Breath sounds: Normal breath sounds.   Abdominal:      General: Abdomen is flat. Bowel sounds are normal.       Palpations: Abdomen is soft.      Tenderness: There is abdominal tenderness (Mild abdominal Pain).   Skin:     General: Skin is warm and dry.   Neurological:      General: No focal deficit present.      Mental Status: She is alert and oriented to person, place, and time.   Psychiatric:         Mood and Affect: Mood normal.         Behavior: Behavior normal.         Laboratory:  Recent Labs     10/02/20  2220   WBC 6.7   RBC 4.55   HEMOGLOBIN 13.4   HEMATOCRIT 41.5   MCV 91.2   MCH 29.5   MCHC 32.3*   RDW 44.4   PLATELETCT 159*   MPV 10.5     Recent Labs     10/02/20  2220   SODIUM 139   POTASSIUM 3.9   CHLORIDE 101   CO2 26   GLUCOSE 104*   BUN 14   CREATININE 1.11   CALCIUM 9.1     Recent Labs     10/02/20  2220   ALTSGPT 19   ASTSGOT 25   ALKPHOSPHAT 83   TBILIRUBIN 0.3   LIPASE 461*   GLUCOSE 104*         No results for input(s): NTPROBNP in the last 72 hours.      No results for input(s): TROPONINT in the last 72 hours.    Imaging:  [id number]-RUQ   Final Result         1.  Echogenic liver compatible with fatty change versus fibrosis.   2.  Cholelithiasis without additional sonographic findings of acute cholecystitis.   3.  Cyst within the right kidney lower pole without visualized complex features.            Assessment/Plan:  I anticipate this patient will require at least two midnights for appropriate medical management, necessitating inpatient admission.    * Gallstone pancreatitis  Assessment & Plan  -Inpatient status on medical floor  -Initial lipase 461.  -Questionable 3 mm stone seen on outside facility on common bile duct.  -Patient will be given IV fluids, 150 mils an hour this evening and repeat lipase in the morning.    Cholelithiases  Assessment & Plan  -Cholelithiasis without cholecystitis.  Common bile duct was 6.4 mm.  -There was questionable 3 mm stone seen on outside facility.  -There is no elevation on LFTs or bilirubin levels.  -I will repeat CMP in the morning however holding off any further imaging  at this time.  -Please consider calling general surgery consult in the morning.    HTN (hypertension)- (present on admission)  Assessment & Plan  -Continue atenolol, losartan hydrochlorothiazide.  -She will be n.p.o. overnight and if systolic blood pressure above 160, will give PRN IV antihypertensive.    Fibromyalgia- (present on admission)  Assessment & Plan  -She will be getting IV morphine this evening.  -Continue citalopram and Paxil      DVT Ppx: SCD's

## 2020-10-03 NOTE — ED PROVIDER NOTES
ED Provider Note    CHIEF COMPLAINT  Chief Complaint   Patient presents with   • Abdominal Pain     pt is transfer from [facility] pt went in for abdominal painx 10 days. Pt has 3cm stone in CBD. pt sent here for ERCP       HPI  Terrie Scott is a 60 y.o. female who presents to the emergency department as a transfer from [facility] for GI consult.  Patient states she is been having right upper quadrant epigastric pain for the last 10 days.  Originally she was seen at [facility] they told her they needed to have her gallbladder out but then they were so busy they said was good to take 4 to 6 days and so she left AGAINST MEDICAL ADVICE [place] went to [facility] night because the pain is not been getting better they told her she had a big stone that needed to be taken out and so she was transferred here.  Fortunately [facility] school system is down and they were unable to send us any of her imaging.  She denies any fevers or chills or nausea or vomiting she does states she has severe pain and bloating with eating and so she has barely had very much to eat in the last 9 days and has not been drinking very much either.  Is also left her constipated.  Pain is currently a 6 out of 10 and does radiate to her back    REVIEW OF SYSTEMS  Positives as above. Pertinent negatives include nausea vomiting fevers chills cough congestion chest pain shortness of breath unilateral weakness numbness or tingling lower abdominal pain diarrhea bloody stools bloody emesis rash night sweats weight loss  All other review of systems are negative    PAST MEDICAL HISTORY   has a past medical history of HTN (hypertension) (11/6/2012).    SOCIAL HISTORY  Social History     Tobacco Use   • Smoking status: Never Smoker   Substance and Sexual Activity   • Alcohol use: No   • Drug use: No   • Sexual activity: Not Currently       SURGICAL HISTORY  patient denies any surgical history    CURRENT MEDICATIONS  Home Medications  "   **Home medications have not yet been reviewed for this encounter**         ALLERGIES  No Known Allergies    PHYSICAL EXAM  VITAL SIGNS: BP (!) 202/98   Pulse 78   Temp 36.3 °C (97.3 °F)   Resp 16   Ht 1.676 m (5' 6\")   Wt 112 kg (246 lb 14.6 oz)   SpO2 98%   BMI 39.85 kg/m²    Pulse ox interpretation: I interpret this pulse ox as normal.  Constitutional: Alert in mild distress  HENT: Normocephalic atraumatic, dry mucous membranes  Eyes: PER, Conjunctiva normal, Non-icteric.   Neck: Normal range of motion, No tenderness, Supple, No stridor.   Cardiovascular: Regular rate and rhythm, no murmurs.   Thorax & Lungs: Normal breath sounds, No respiratory distress, No wheezing, No chest tenderness.   Abdomen: Bowel sounds normal, Soft, right upper quadrant epigastric tenderness on exam no pulsatile masses. No peritoneal signs.  Skin: Warm, Dry, No erythema, No rash.   Back: No bony tenderness, No CVA tenderness.   Extremities/MSK: Intact equal distal pulses, No edema, No tenderness, No cyanosis, no major deformities noted  Neurologic: Alert and oriented x3, No focal deficits noted.       DIFFERENTIAL DIAGNOSIS AND WORK UP PLAN    This is a 60 y.o. female who presents with possible large CBD stone apparently there labs within normal limits but she does have epigastric and right upper quadrant pain we will repeat an ultrasound laboratory analysis I doubt she has a cholecystitis but it could have developed into 1, she will be given some IV fluids because she does appear dehydrated on examination as well as some pain management and reassess    DIAGNOSTIC STUDIES / PROCEDURES    EKG  No results found for this or any previous visit.    LABS  Pertinent Lab Findings  CBC was normal limits panel mild thrombocytopenia platelet count 159 CMP within normal limits lipase elevated at 461 COVID sent      RADIOLOGY  US-RUQ   Final Result         1.  Echogenic liver compatible with fatty change versus fibrosis.   2.  " Cholelithiasis without additional sonographic findings of acute cholecystitis.   3.  Cyst within the right kidney lower pole without visualized complex features.        The radiologist's interpretation of all radiological studies have been reviewed by me.      COURSE & MEDICAL DECISION MAKING  Pertinent Labs & Imaging studies reviewed. (See chart for details)    12:20 AM  Reassessed the patient at the bedside she is resting comfortably she has very bad sleep apnea so she become hypoxic when she sleeps she states her pain is better under control we discussed her ultrasound potential changes from the one was done more than earlier night is no evidence of a CBD stone but she does have gallstone pancreatitis due to her continued pain and decreased appetite she will be hospitalized for possible GI versus surgical consult    12:31 AM  Spoke w Dr Cheung who has accepted the patient for hospitalization    I verified that the patient was wearing a mask and I was wearing appropriate PPE every time I entered the room. The patient's mask was on the patient at all times during my encounter except for a brief view of the oropharynx.    DISPOSITION:  Patient will be evaluated for hospitalization by Dr Cheung in guarded condition.        FINAL IMPRESSION  1. Symptomatic cholelithiasis  2. Gallstone pancreatitis          Electronically signed by: Dara Antonio M.D., 10/2/2020 10:40 PM    This dictation has been created using voice recognition software and/or scribes. The accuracy of the dictation is limited by the abilities of the software and the expertise of the scribes. I expect there may be some errors of grammar and possibly content. I made every attempt to manually correct the errors within my dictation. However, errors related to voice recognition software and/or scribes may still exist and should be interpreted within the appropriate context.

## 2020-10-03 NOTE — ANESTHESIA PREPROCEDURE EVALUATION
Relevant Problems   CARDIAC   (+) HTN (hypertension)      Other   (+) Arthritis       Physical Exam    Airway   Mallampati: II  TM distance: >3 FB  Neck ROM: full       Cardiovascular - normal exam  Rhythm: regular  Rate: normal     Dental - normal exam           Pulmonary - normal exam  Breath sounds clear to auscultation  (+) decreased breath sounds     Abdominal    Neurological - normal exam                 Anesthesia Plan    ASA 4   ASA physical status 4 criteria: sepsis    Plan - general       Airway plan will be ETT        Induction: intravenous and rapid sequence    Postoperative Plan: Postoperative administration of opioids is intended.    Pertinent diagnostic labs and testing reviewed    Informed Consent:    Anesthetic plan and risks discussed with patient.    Use of blood products discussed with: patient whom consented to blood products.

## 2020-10-03 NOTE — ED TRIAGE NOTES
"Chief Complaint   Patient presents with   • Abdominal Pain     pt is transfer from [facility] pt went in for abdominal painx 10 days. Pt has 3cm stone in CBD. pt sent here for ERCP         Pt BIB EMS transfer. Pt AOx4, no signs of distress, hx of HTN. Pt was given 4 of zofran and 4 of morphine at [facility]. Pt states pain is better at 5/10. Pt has not taken her BP medications today,       BP (!) 202/98   Pulse 78   Temp 36.3 °C (97.3 °F)   Resp 16   Ht 1.676 m (5' 6\")   Wt 112 kg (246 lb 14.6 oz)   SpO2 98%   BMI 39.85 kg/m²     "

## 2020-10-03 NOTE — PROGRESS NOTES
2 RN skin check completed, no breakdown noted on bony prominences and under medical device. Pt turns self, pillows in use. Will continue to monitor.

## 2020-10-03 NOTE — ASSESSMENT & PLAN NOTE
-Initial lipase 461, now normalized all other labs within normal  -Questionable 3 mm stone seen on outside imaging at [facility], not seen on imaging at [facility] or US done here    - s/p laparoscopic cholecystectomy 10/3  - lipase normal   - advance diet   - supportive care with Pain and nausea control PRN

## 2020-10-03 NOTE — PROGRESS NOTES
Hospital Medicine Daily Progress Note    Date of Service  10/3/2020    Chief Complaint  60 y.o. female with PMHx of hypertension, SIMONE and MS  admitted 10/2/2020 with complaints of RUQ/epigastric pain made worse with eating over the last 10 days.     Hospital Course    patient admitted overnight with continued RUQ/epigastric pain x 10 days. Was seen at [facility] initially on 9/27. Ultrasound showed chollithiasis without evidence of cholecystitis, however she did leave AMA as they said it would be 4-6 before she could get surgery which was recommended. Patient then came to [facility] and per the chart was found to have a 3cm (? Vs mm) gallstone obstruction (unable to obtain imaging, information per ER note) thus she was sent here for further evaluation and intervention. On arrival here repeat US was done and again showed cholelithiasis but without any evidence of duct obstruction or dilation and no evidence of inflammation. Labs were unremarkable aside from elevated lipase of 461. Patient was placed on IV fluids made NPO and admitted or further evaluation and treatment        Interval Problem Update  Patient seen and examined at bedside, still complaining of RUQ pain and epigastric pain described as a band around her upper abdomen. Denies nausea or vomiting, last BM 4 days ago   - continue NPO and IVF   - surgery consulted, discussed need for possible further imaging, she will evaluate, appreciate recommendations   - supportive care with pain control     Consultants/Specialty  General surgery - [facility]     Code Status  Full Code    Disposition  Pending possible cholecystectomy     Review of Systems  Review of Systems   Constitutional: Positive for malaise/fatigue. Negative for chills and fever.   HENT: Negative for congestion and sore throat.    Eyes: Negative for blurred vision and double vision.   Respiratory: Negative for cough and shortness of breath.    Cardiovascular: Negative for chest pain and leg swelling.    Gastrointestinal: Positive for abdominal pain, constipation and heartburn. Negative for blood in stool, diarrhea, melena and vomiting.   Genitourinary: Negative for dysuria and urgency.   Musculoskeletal: Positive for back pain.   Neurological: Negative for dizziness, focal weakness and headaches.   Psychiatric/Behavioral: Negative for depression and substance abuse.        Physical Exam  Temp:  [36.2 °C (97.2 °F)-36.6 °C (97.9 °F)] 36.2 °C (97.2 °F)  Pulse:  [69-80] 73  Resp:  [15-18] 18  BP: (122-202)/(74-98) 122/80  SpO2:  [91 %-98 %] 96 %    Physical Exam  Constitutional:       Appearance: Normal appearance. She is obese. She is not ill-appearing or toxic-appearing.   HENT:      Head: Normocephalic and atraumatic.      Mouth/Throat:      Mouth: Mucous membranes are dry.      Pharynx: Oropharynx is clear.   Eyes:      Extraocular Movements: Extraocular movements intact.      Pupils: Pupils are equal, round, and reactive to light.   Neck:      Musculoskeletal: Normal range of motion and neck supple.   Cardiovascular:      Rate and Rhythm: Normal rate and regular rhythm.      Pulses: Normal pulses.      Heart sounds: No murmur.   Pulmonary:      Effort: Pulmonary effort is normal. No respiratory distress.      Breath sounds: Normal breath sounds.   Abdominal:      General: Abdomen is flat. There is no distension.      Palpations: Abdomen is soft.      Tenderness: There is abdominal tenderness. There is no guarding or rebound.      Comments: Tenderness to palpation of epigastric region, neg martin's sign   Musculoskeletal: Normal range of motion.         General: No swelling.   Skin:     General: Skin is warm and dry.      Capillary Refill: Capillary refill takes less than 2 seconds.   Neurological:      General: No focal deficit present.      Mental Status: She is alert and oriented to person, place, and time.      Cranial Nerves: No cranial nerve deficit.   Psychiatric:         Mood and Affect: Mood normal.          Behavior: Behavior normal.         Thought Content: Thought content normal.         Fluids  No intake or output data in the 24 hours ending 10/03/20 0952    Laboratory  Recent Labs     10/02/20  2220   WBC 6.7   RBC 4.55   HEMOGLOBIN 13.4   HEMATOCRIT 41.5   MCV 91.2   MCH 29.5   MCHC 32.3*   RDW 44.4   PLATELETCT 159*   MPV 10.5     Recent Labs     10/02/20  2220   SODIUM 139   POTASSIUM 3.9   CHLORIDE 101   CO2 26   GLUCOSE 104*   BUN 14   CREATININE 1.11   CALCIUM 9.1                   Imaging  US-RUQ   Final Result         1.  Echogenic liver compatible with fatty change versus fibrosis.   2.  Cholelithiasis without additional sonographic findings of acute cholecystitis.   3.  Cyst within the right kidney lower pole without visualized complex features.           Assessment/Plan  * Gallstone pancreatitis  Assessment & Plan  -Inpatient status on medical floor  -Initial lipase 461, all other labs within normal  -Questionable 3 mm stone seen on outside imaging at [facility], not seen on imaging at [facility]   - continue IVF, and keep NPO pending surgical evaluation  - surgery consulted, appreciate recommendations   -Pain and nausea control PRN    Cholelithiases  Assessment & Plan  -Cholelithiasis without cholecystitis.  Common bile duct was 6.4 mm on our imaging   -There was questionable 3 mm stone seen on outside facility.  -There is no elevation on LFTs or bilirubin levels.  - surgery consulted, appreciate recommendations/managment     HTN (hypertension)- (present on admission)  Assessment & Plan  -Continue atenolol, losartan hydrochlorothiazide.  - stable     Fibromyalgia- (present on admission)  Assessment & Plan  -Continue citalopram and Paxil  - supportive care with pain control for acute issue       VTE prophylaxis: SCDs, pending likely surgical intervention

## 2020-10-03 NOTE — PROGRESS NOTES
Cholecystitis, possible gallstone pancreatitis  Plan for cholecystectomy  Surgery consult dictated

## 2020-10-03 NOTE — H&P
Hospital Medicine History & Physical Note    Date of Service  10/3/2020    Primary Care Physician  No primary care provider on file.    Consultants  Please call general surgery consult in the morning    Code Status  Full Code    Chief Complaint  Chief Complaint   Patient presents with   • Abdominal Pain     pt is transfer from [facility] pt went in for abdominal painx 10 days. Pt has 3cm stone in CBD. pt sent here for ERCP       History of Presenting Illness  60 y.o. female, with history of hypertension and fibromyalgia, who is coming as a direct transfer ER to ER from [facility] on 10/2/2020 for a need of possible GI consult with underlying cholelithiasis/obstructing disease.    Abdominal pain started 10 days ago.  She has epigastric pain with radiation to right upper quadrant.  Pain is sharp in nature and mostly constant.  She has nausea and dry heaving associated and very poor appetite and oral intake.  She also has associated abdominal distention.  She was in [place] when pain started and went to [facility] where she was admitted.  She was told she needed a cholecystectomy however since procedure was not emergency, they said probably will take 4 to 6 days to get it done, reason why last Sunday, she left AGAINST MEDICAL ADVICE.  Since her pain was worsening, today she went to [facility].  There was a concern of possible stone in common bile duct, reason why she was sent here.    ER COURSE:  -Patient had an right upper quadrant abdominal ultrasound done in the ED.  It is showing cholelithiasis without cholecystitis with common bile duct 6.4 mm.  There was gallbladder wall thickness measuring 2.6 mm and no parous cholecystic fluid.  -She has no elevation on LFTs or bilirubin levels at this time.  -Lipase is 460.    Review of Systems  Review of Systems   Constitutional: Positive for malaise/fatigue. Negative for fever.   HENT: Negative for congestion and sore throat.    Eyes: Negative for blurred  vision and double vision.   Respiratory: Negative for cough and shortness of breath.    Cardiovascular: Negative for chest pain and palpitations.   Gastrointestinal: Positive for abdominal pain, nausea and vomiting.   Genitourinary: Negative for dysuria and urgency.   Musculoskeletal: Negative for myalgias and neck pain.   Skin: Negative for itching and rash.   Neurological: Negative for dizziness, weakness and headaches.   Endo/Heme/Allergies: Does not bruise/bleed easily.   Psychiatric/Behavioral: Negative for depression. The patient does not have insomnia.        Past Medical History   has a past medical history of HTN (hypertension) (11/6/2012).    Surgical History  Toe surgery    Family History  family history includes Diabetes in her father and mother; Heart Attack in her father and mother; Hyperlipidemia in her father and mother; Hypertension in her father and mother.     Social History   reports that she has never smoked. She does not have any smokeless tobacco history on file. She reports that she does not drink alcohol or use drugs.    Allergies  No Known Allergies    Medications  Prior to Admission Medications   Prescriptions Last Dose Informant Patient Reported? Taking?   cetirizine (ZYRTEC) 10 MG TABS   Yes No   Sig: Take 1 Tab by mouth every day.   famotidine (PEPCID) 40 MG TABS   No No   Sig: Take 1 Tab by mouth 2 times a day.   hydrocodone-acetaminophen (VICODIN) 5-500 MG TABS   No No   Sig: Take 1 Tab by mouth every 6 hours as needed.   ketoconazole (NIZORAL) 2 % CREA   No No   Sig: To hand rash for 4 weeks   paroxetine (PAXIL) 20 MG Tab   Yes No   Sig: Take 20 mg by mouth every day.      Facility-Administered Medications: None       Physical Exam  Temp:  [36.3 °C (97.3 °F)-36.3 °C (97.4 °F)] 36.3 °C (97.4 °F)  Pulse:  [69-78] 71  Resp:  [15-18] 16  BP: (157-202)/(74-98) 162/98  SpO2:  [91 %-98 %] 98 %    Physical Exam  Constitutional:       Appearance: Normal appearance.   HENT:      Head:  Normocephalic and atraumatic.      Mouth/Throat:      Mouth: Mucous membranes are moist.      Pharynx: Oropharynx is clear.   Eyes:      Extraocular Movements: Extraocular movements intact.      Pupils: Pupils are equal, round, and reactive to light.   Neck:      Musculoskeletal: Normal range of motion and neck supple.   Cardiovascular:      Rate and Rhythm: Normal rate and regular rhythm.      Heart sounds: Normal heart sounds.   Pulmonary:      Effort: Pulmonary effort is normal.      Breath sounds: Normal breath sounds.   Abdominal:      General: Abdomen is flat. Bowel sounds are normal. There is distension (Minimal).      Palpations: Abdomen is soft.      Tenderness: There is abdominal tenderness (Mild epigastric pain on palpation).   Skin:     General: Skin is warm and dry.   Neurological:      General: No focal deficit present.      Mental Status: She is alert and oriented to person, place, and time.   Psychiatric:         Mood and Affect: Mood normal.         Behavior: Behavior normal.         Laboratory:  Recent Labs     10/02/20  2220   WBC 6.7   RBC 4.55   HEMOGLOBIN 13.4   HEMATOCRIT 41.5   MCV 91.2   MCH 29.5   MCHC 32.3*   RDW 44.4   PLATELETCT 159*   MPV 10.5     Recent Labs     10/02/20  2220   SODIUM 139   POTASSIUM 3.9   CHLORIDE 101   CO2 26   GLUCOSE 104*   BUN 14   CREATININE 1.11   CALCIUM 9.1     Recent Labs     10/02/20  2220   ALTSGPT 19   ASTSGOT 25   ALKPHOSPHAT 83   TBILIRUBIN 0.3   LIPASE 461*   GLUCOSE 104*         No results for input(s): NTPROBNP in the last 72 hours.      No results for input(s): TROPONINT in the last 72 hours.    Imaging:  US-RUQ   Final Result         1.  Echogenic liver compatible with fatty change versus fibrosis.   2.  Cholelithiasis without additional sonographic findings of acute cholecystitis.   3.  Cyst within the right kidney lower pole without visualized complex features.            Assessment/Plan:  I anticipate this patient will require at least two  midnights for appropriate medical management, necessitating inpatient admission.    * Gallstone pancreatitis  Assessment & Plan  -Inpatient status on medical floor  -Initial lipase 461.  -Questionable 3 mm stone seen on outside facility on common bile duct.  -Patient will be given IV fluids, 150 mils an hour this evening and repeat lipase in the morning.  -N.p.o.  -Pain and nausea control PRN    Cholelithiases  Assessment & Plan  -Cholelithiasis without cholecystitis.  Common bile duct was 6.4 mm.  -There was questionable 3 mm stone seen on outside facility.  -There is no elevation on LFTs or bilirubin levels.  -I will repeat CMP in the morning however holding off any further imaging at this time.  -Please consider calling general surgery consult in the morning.    HTN (hypertension)- (present on admission)  Assessment & Plan  -Continue atenolol, losartan hydrochlorothiazide.  -She will be n.p.o. overnight and if systolic blood pressure above 160, will give PRN IV antihypertensive.    Fibromyalgia- (present on admission)  Assessment & Plan  -She will be getting IV morphine this evening.  -Continue citalopram and Paxil    DVT prophylaxis with SCD's

## 2020-10-03 NOTE — ED NOTES
Pt being transported to [place] [id number] in stable condition.    Called [place] [id number] to give report, nurse unable to take report at this time, will call back

## 2020-10-03 NOTE — ANESTHESIA QCDR
2019 [facility] Clinical Data Registry (for Quality Improvement)     Postoperative nausea/vomiting risk protocol (Adult = 18 yrs and Pediatric 3-17 yrs)- (430 and 463)  General inhalation anesthetic (NOT TIVA) with PONV risk factors: Yes  Provision of anti-emetic therapy with at least 2 different classes of agents: Yes   Patient DID NOT receive anti-emetic therapy and reason is documented in Medical Record:  N/A    Multimodal Pain Management- (477)  Non-emergent surgery AND patient age >= 18: No  Use of Multimodal Pain Management, two or more drugs and/or interventions, NOT including systemic opioids:   Exception: Documented allergy to multiple classes of analgesics:     Smoking Abstinence (404)  Patient is current smoker (cigarette, pipe, e-cig, marijuanna): No  Elective Surgery:   Abstinence instructions provided prior to day of surgery:   Patient abstained from smoking on day of surgery:     Pre-Op Beta-Blocker in Isolated CABG (44)  Isolated CABG AND patient age >= 18: No  Beta-blocker admin within 24 hours of surgical incision:   Exception:of medical reason(s) for not administering beta blocker within 24 hours prior to surgical incision (e.g., not  indicated,other medical reason):     PACU assessment of acute postoperative pain prior to Anesthesia Care End- Applies to Patients Age = 18- (ABG7)  Initial PACU pain score is which of the following: < 7/10  Patient unable to report pain score: N/A    Post-anesthetic transfer of care checklist/protocol to PACU/ICU- (426 and 427)  Upon conclusion of case, patient transferred to which of the following locations: PACU/Non-ICU  Use of transfer checklist/protocol: Yes  Exclusion: Service Performed in Patient Hospital Room (and thus did not require transfer): N/A  Unplanned admission to ICU related to anesthesia service up through end of PACU care- (MD51)  Unplanned admission to ICU (not initially anticipated at anesthesia start time):

## 2020-10-03 NOTE — ASSESSMENT & PLAN NOTE
-Cholelithiasis without cholecystitis.  Common bile duct was 6.4 mm on our imaging   -There was questionable 3 mm stone seen on outside facility.  -There is no elevation on LFTs or bilirubin levels.  - s/p lap minesh, clear for DC per surgery   - advance diet, pain/nasuea control

## 2020-10-04 ENCOUNTER — HOSPITAL ENCOUNTER (OUTPATIENT)
Dept: RADIOLOGY | Facility: MEDICAL CENTER | Age: 60
End: 2020-10-04
Payer: COMMERCIAL

## 2020-10-04 LAB
ALBUMIN SERPL BCP-MCNC: 3.8 G/DL (ref 3.2–4.9)
ALBUMIN/GLOB SERPL: 1.4 G/DL
ALP SERPL-CCNC: 77 U/L (ref 30–99)
ALT SERPL-CCNC: 34 U/L (ref 2–50)
ANION GAP SERPL CALC-SCNC: 13 MMOL/L (ref 7–16)
AST SERPL-CCNC: 40 U/L (ref 12–45)
BILIRUB SERPL-MCNC: 0.2 MG/DL (ref 0.1–1.5)
BUN SERPL-MCNC: 13 MG/DL (ref 8–22)
CALCIUM SERPL-MCNC: 8.6 MG/DL (ref 8.5–10.5)
CHLORIDE SERPL-SCNC: 102 MMOL/L (ref 96–112)
CO2 SERPL-SCNC: 22 MMOL/L (ref 20–33)
CREAT SERPL-MCNC: 0.86 MG/DL (ref 0.5–1.4)
ERYTHROCYTE [DISTWIDTH] IN BLOOD BY AUTOMATED COUNT: 44.4 FL (ref 35.9–50)
GLOBULIN SER CALC-MCNC: 2.8 G/DL (ref 1.9–3.5)
GLUCOSE SERPL-MCNC: 141 MG/DL (ref 65–99)
HCT VFR BLD AUTO: 39.1 % (ref 37–47)
HGB BLD-MCNC: 12.6 G/DL (ref 12–16)
LIPASE SERPL-CCNC: 15 U/L (ref 11–82)
MCH RBC QN AUTO: 29.4 PG (ref 27–33)
MCHC RBC AUTO-ENTMCNC: 32.2 G/DL (ref 33.6–35)
MCV RBC AUTO: 91.1 FL (ref 81.4–97.8)
PLATELET # BLD AUTO: 139 K/UL (ref 164–446)
PMV BLD AUTO: 10.5 FL (ref 9–12.9)
POTASSIUM SERPL-SCNC: 4.4 MMOL/L (ref 3.6–5.5)
PROT SERPL-MCNC: 6.6 G/DL (ref 6–8.2)
RBC # BLD AUTO: 4.29 M/UL (ref 4.2–5.4)
SODIUM SERPL-SCNC: 137 MMOL/L (ref 135–145)
WBC # BLD AUTO: 7.5 K/UL (ref 4.8–10.8)

## 2020-10-04 PROCEDURE — 51798 US URINE CAPACITY MEASURE: CPT

## 2020-10-04 PROCEDURE — A9270 NON-COVERED ITEM OR SERVICE: HCPCS | Performed by: HOSPITALIST

## 2020-10-04 PROCEDURE — 83690 ASSAY OF LIPASE: CPT

## 2020-10-04 PROCEDURE — 700111 HCHG RX REV CODE 636 W/ 250 OVERRIDE (IP): Performed by: HOSPITALIST

## 2020-10-04 PROCEDURE — 80053 COMPREHEN METABOLIC PANEL: CPT

## 2020-10-04 PROCEDURE — 700102 HCHG RX REV CODE 250 W/ 637 OVERRIDE(OP): Performed by: NURSE PRACTITIONER

## 2020-10-04 PROCEDURE — 36415 COLL VENOUS BLD VENIPUNCTURE: CPT

## 2020-10-04 PROCEDURE — 99232 SBSQ HOSP IP/OBS MODERATE 35: CPT | Performed by: STUDENT IN AN ORGANIZED HEALTH CARE EDUCATION/TRAINING PROGRAM

## 2020-10-04 PROCEDURE — 700101 HCHG RX REV CODE 250: Performed by: HOSPITALIST

## 2020-10-04 PROCEDURE — 85027 COMPLETE CBC AUTOMATED: CPT

## 2020-10-04 PROCEDURE — 700102 HCHG RX REV CODE 250 W/ 637 OVERRIDE(OP): Performed by: STUDENT IN AN ORGANIZED HEALTH CARE EDUCATION/TRAINING PROGRAM

## 2020-10-04 PROCEDURE — A9270 NON-COVERED ITEM OR SERVICE: HCPCS | Performed by: NURSE PRACTITIONER

## 2020-10-04 PROCEDURE — A9270 NON-COVERED ITEM OR SERVICE: HCPCS | Performed by: STUDENT IN AN ORGANIZED HEALTH CARE EDUCATION/TRAINING PROGRAM

## 2020-10-04 PROCEDURE — 770006 HCHG ROOM/CARE - MED/SURG/GYN SEMI*

## 2020-10-04 PROCEDURE — 700102 HCHG RX REV CODE 250 W/ 637 OVERRIDE(OP): Performed by: HOSPITALIST

## 2020-10-04 RX ORDER — CETIRIZINE HYDROCHLORIDE 10 MG/1
10 TABLET ORAL DAILY
Status: DISCONTINUED | OUTPATIENT
Start: 2020-10-04 | End: 2020-10-06 | Stop reason: HOSPADM

## 2020-10-04 RX ORDER — HYDROCHLOROTHIAZIDE 25 MG/1
25 TABLET ORAL DAILY
Status: DISCONTINUED | OUTPATIENT
Start: 2020-10-04 | End: 2020-10-04

## 2020-10-04 RX ORDER — CETIRIZINE HYDROCHLORIDE 10 MG/1
10 TABLET ORAL DAILY
Status: DISCONTINUED | OUTPATIENT
Start: 2020-10-05 | End: 2020-10-04

## 2020-10-04 RX ORDER — LOSARTAN POTASSIUM 50 MG/1
100 TABLET ORAL DAILY
Status: DISCONTINUED | OUTPATIENT
Start: 2020-10-04 | End: 2020-10-04

## 2020-10-04 RX ORDER — TIZANIDINE 4 MG/1
4 TABLET ORAL 2 TIMES DAILY PRN
COMMUNITY

## 2020-10-04 RX ORDER — FAMOTIDINE 20 MG/1
20 TABLET, FILM COATED ORAL 2 TIMES DAILY
Status: DISCONTINUED | OUTPATIENT
Start: 2020-10-04 | End: 2020-10-06 | Stop reason: HOSPADM

## 2020-10-04 RX ORDER — TIZANIDINE 4 MG/1
4 TABLET ORAL 2 TIMES DAILY PRN
Status: DISCONTINUED | OUTPATIENT
Start: 2020-10-04 | End: 2020-10-06 | Stop reason: HOSPADM

## 2020-10-04 RX ORDER — OXYBUTYNIN CHLORIDE 10 MG/1
10 TABLET, EXTENDED RELEASE ORAL DAILY
Status: DISCONTINUED | OUTPATIENT
Start: 2020-10-04 | End: 2020-10-06 | Stop reason: HOSPADM

## 2020-10-04 RX ORDER — ALPRAZOLAM 0.25 MG/1
0.25 TABLET ORAL
Status: DISCONTINUED | OUTPATIENT
Start: 2020-10-04 | End: 2020-10-06 | Stop reason: HOSPADM

## 2020-10-04 RX ADMIN — DOCUSATE SODIUM 50 MG AND SENNOSIDES 8.6 MG 2 TABLET: 8.6; 5 TABLET, FILM COATED ORAL at 19:32

## 2020-10-04 RX ADMIN — OXYCODONE 5 MG: 5 TABLET ORAL at 16:04

## 2020-10-04 RX ADMIN — CETIRIZINE HYDROCHLORIDE 10 MG: 10 TABLET, FILM COATED ORAL at 23:24

## 2020-10-04 RX ADMIN — OXYCODONE 5 MG: 5 TABLET ORAL at 02:33

## 2020-10-04 RX ADMIN — OXYCODONE 5 MG: 5 TABLET ORAL at 11:01

## 2020-10-04 RX ADMIN — OXYCODONE 5 MG: 5 TABLET ORAL at 22:15

## 2020-10-04 RX ADMIN — HYDROCHLOROTHIAZIDE 25 MG: 25 TABLET ORAL at 04:41

## 2020-10-04 RX ADMIN — ACETAMINOPHEN 650 MG: 325 TABLET, FILM COATED ORAL at 04:42

## 2020-10-04 RX ADMIN — ACETAMINOPHEN 650 MG: 325 TABLET, FILM COATED ORAL at 14:25

## 2020-10-04 RX ADMIN — LOSARTAN POTASSIUM 100 MG: 50 TABLET, FILM COATED ORAL at 04:39

## 2020-10-04 RX ADMIN — ONDANSETRON 4 MG: 4 TABLET, ORALLY DISINTEGRATING ORAL at 14:25

## 2020-10-04 RX ADMIN — TIZANIDINE 4 MG: 4 TABLET ORAL at 15:05

## 2020-10-04 RX ADMIN — POLYETHYLENE GLYCOL 3350 1 PACKET: 17 POWDER, FOR SOLUTION ORAL at 16:03

## 2020-10-04 RX ADMIN — OXYBUTYNIN CHLORIDE 10 MG: 5 TABLET, EXTENDED RELEASE ORAL at 11:01

## 2020-10-04 RX ADMIN — FAMOTIDINE 20 MG: 20 TABLET, FILM COATED ORAL at 11:01

## 2020-10-04 RX ADMIN — DOCUSATE SODIUM 50 MG AND SENNOSIDES 8.6 MG 2 TABLET: 8.6; 5 TABLET, FILM COATED ORAL at 04:40

## 2020-10-04 RX ADMIN — FAMOTIDINE 20 MG: 20 TABLET, FILM COATED ORAL at 19:32

## 2020-10-04 ASSESSMENT — LIFESTYLE VARIABLES: SUBSTANCE_ABUSE: 0

## 2020-10-04 ASSESSMENT — ENCOUNTER SYMPTOMS
HEADACHES: 0
SHORTNESS OF BREATH: 0
VOMITING: 0
DIARRHEA: 0
DOUBLE VISION: 0
COUGH: 0
BACK PAIN: 1
ABDOMINAL PAIN: 1
NAUSEA: 1
DIZZINESS: 0
SORE THROAT: 0
DEPRESSION: 0
CONSTIPATION: 1
BLOOD IN STOOL: 0
FEVER: 0
CHILLS: 0
FOCAL WEAKNESS: 0
HEARTBURN: 1
BLURRED VISION: 0

## 2020-10-04 ASSESSMENT — PAIN DESCRIPTION - PAIN TYPE
TYPE: ACUTE PAIN

## 2020-10-04 NOTE — CONSULTS
DATE OF SERVICE:  10/03/2020    REASON FOR SURGERY CONSULTATION:  1.  Cholelithiasis.  2.  Cholecystitis.  3.  Gallstone pancreatitis.    HISTORY OF PRESENT ILLNESS:  The patient is a 60-year-old female complaining   of a 2-week history of constant epigastric right upper quadrant pain and   nausea.  The patient was first seen at [facility] and due to a   delay in her surgical treatment for cholecystectomy, she left and went to   [facility].  From the emergency department, the patient's   laboratory findings were concerning for common bile duct stone and patient was   transferred to [facility] for possible need of ERCP.  Since her   admission, patient's LFTs have been normal.  Her admitting lipase level of 461   is down to 62.  Ultrasound done here showed fatty liver, cholelithiasis with   thickened gallbladder wall 2.6 mm, common duct of 6.4 mm.  The patient   currently has complaints of constant deep and sharp epigastric pain with some   radiation to her right side.  There are no modifying factors.  She does have   nausea, but no vomiting and complains of anorexia.    PAST MEDICAL HISTORY:  Hypertension.    SOCIAL HISTORY:  Denies tobacco, alcohol, or illicit drugs.    SURGERIES:  None.    MEDICATIONS:  None.    ALLERGIES:  No known drug allergies.    REVIEW OF SYSTEMS:  Positive for abdominal pain, nausea.  No unintentional   weight loss.  No melena or dark tarry stools.  All other review of systems on   a 12-point review according to AMA guidelines are negative except for that   stated in the HPI.    PHYSICAL EXAMINATION:  VITAL SIGNS:  Temperature 98, pulse 65, blood pressure 154/63, sats 92% on 2 L   nasal cannula.  CONSTITUTIONAL:  The patient is alert and oriented x4.  No apparent distress.    Morbidly obese.  HEENT:  Normocephalic, atraumatic.  Mucous membranes dry.  Eyes:  Pupils are   equally round and reactive to light.  No scleral icterus.  NECK:  Soft, no JVD, no  lymphadenopathy.  CARDIOVASCULAR:  Regular rate and rhythm.  EXTREMITIES:  Warm, no edema.  THORAX/LUNGS:  Normal respiratory effort.  No wheezes or stridor.  ABDOMEN:  Soft, nondistended.  She does have tenderness to palpation in the   right upper quadrant with deep palpation.  No rebound or guarding.  SKIN:  No jaundice.  Warm and well perfused.  No petechia or rashes.  NEUROLOGIC:  Cranial nerves II-XII grossly intact.  Normal sensation and   strength in bilateral upper and lower extremities.    LABORATORY DATA:  White count of 6.7, hemoglobin 13, hematocrit 41, platelets   159.  Sodium 139, potassium 3.9, chloride 101, bicarbonate 26, BUN is 14,   creatinine 1.1, AST 25, ALT 19, total bilirubin 0.3, alkaline phosphatase 83,   and lipase 62.    ASSESSMENT AND PLAN:  A 60-year-old female with 2-week history of persistent   epigastric pain with radiation to her right upper abdomen.  The patient did   initially have elevated lipase concerning for common duct stone, but since   then her labs have normalized and she has no dilatation of her common duct on   ultrasound.  Due to the persistent pain, she clinically does have   cholecystitis.  Plan to proceed with laparoscopic cholecystectomy.  Risks of   surgery discussed, bleeding, infection, possible need for open surgery.    Discussed possible retained stone, bile leak requiring postoperative ERCP.    The patient stated she understands the risks, indications for surgery and   wishes to proceed with the above plan.       ____________________________________     MD NA Briceño / ALYSSA    DD:  10/03/2020 18:04:48  DT:  10/03/2020 22:19:20    D#:  [id number]  Job#:  [id number]

## 2020-10-04 NOTE — OP REPORT
DATE OF SERVICE:  10/03/2020    PREOPERATIVE DIAGNOSES:  1.  Cholecystitis.  2.  Gallstone pancreatitis.    POSTOPERATIVE DIAGNOSES:    1.  Cholecystitis.  2.  Gallstone pancreatitis.    PROCEDURE:  Laparoscopic cholecystectomy.    SURGEON:  Rossi Woodall MD    ANESTHESIOLOGIST:  Fernando Chappell MD    ANESTHESIA:  GET.    FINDINGS:  Omental adhesions to the anterior surface of the gallbladder, small   gallstones and sludge.    COMPLICATIONS:  None.    ESTIMATED BLOOD LOSS:  10 mL.    SPECIMENS:  Gallbladder.    DRAINS:  None.    PROPHYLAXIS:  IV Cefotan and SCDs.    PROCEDURE IN DETAIL:  Patient was consented preoperatively, taken to the   operating room and placed in the supine position.  Anesthesia was induced.    She was endotracheally intubated without difficulty.  A timeout was performed.    Patient's abdomen was then prepped and draped in the usual sterile fashion.    A 2 cm supraumbilical incision was made to place a 12 mm Noah port under   direct visualization.  The abdomen was then insufflated to 12 mmHg and a 10 mm   30-degree scope was then placed within the abdomen.  Patient was then placed   in reverse Trendelenburg and slightly airplaned to her left side down.  An 11   mm port was placed in the epigastrium and two 5 mm ports in the right upper   quadrant.  The gallbladder was grasped and retracted cephalad.  There were   omental adhesions to the anterior surface that were bluntly swept down.    Peritoneal attachments on the medial and lateral aspect of the gallbladder   were incised.  The underside of the gallbladder was cleared off and the   critical view was obtained.  Three 10 mm clips were placed proximally on the   cystic duct, one distally, two 10 mm clips placed proximally on the cystic   artery.  Both structures were then divided.  Gallbladder was removed off of   the gallbladder fossa, achieving hemostasis along the way.  The gallbladder   was placed in an EndoCatch bag and withdrawn from the  abdomen.  Gallbladder   fossa was inspected, it was dry, the clips were in good position.  The 11 mm   port site was closed with an Endoclose using 0 Vicryl.  Hemostasis was once   again confirmed and the 5 mm ports were removed under direct visualization.    There was no bleeding from the abdominal wall.  The abdomen was then   desufflated.  The supraumbilical fascial incision was closed with a   figure-of-eight using 0 Vicryl.  All skin incisions were then closed with a   subcuticular stitch using 4-0 Monocryl.  Steri-Strips and Tegaderm dressings   were then applied.  All lap, sponge and instrument counts were correct at the   end of the case x2.  The patient tolerated the procedure well.  She was   awakened from anesthesia, extubated, taken to the postanesthesia care unit in   good condition.       ____________________________________     MD NA Briceño / ALYSSA    DD:  10/03/2020 18:09:59  DT:  10/03/2020 21:38:13    D#:  [id number]  Job#:  [id number]

## 2020-10-04 NOTE — PROGRESS NOTES
Pt is A&Ox4. VSS. Lungs clear diminished on 2LNC for SIMONE. Pt c/o pain 8/10 in the lap sites. Morphine given with good releif. Pt becomes nauseous with morphine, Zofran not available to give at the time. PO 12.5mg of phenergan given. Asked APRN on call for order of PO pain  meds to have on board to help control the pain. Oxycodone ordered PRN. Lap sites x4 to abd c/d/i with dermabond and Tegaderm with scant drainage to upper lap site. SCDs on BLE. IVF running @ 150ml/hr per order. Pt got up and urinated 300ml around 2320 post op. Bed low and locked, call light within reach, safety precautions in place, hourly rounding, WCM.

## 2020-10-04 NOTE — PROGRESS NOTES
Progress Note    Author:  Rossi Woodall MD    Date & Time:   10/4/2020   12:44 PM          Patient ID:             Name:             Terrie Scott     YOB: 1960  Age:                 60 y.o.  female   MRN:               [id number]    ________________________________________________________________________      Interval Events:       Pod 1 laparoscopic cholecystectomy,  Mild nausea         Exam:       Vitals:    10/04/20 0741   BP: 149/71   Pulse: 80   Resp: 18   Temp: 36.4 °C (97.6 °F)   SpO2: 93%     SpO2  Min: 91 %  Max: 98 %  O2 (LPM)  Min: 0  Max: 8  Temp  Min: 36.2 °C (97.2 °F)  Max: 37.1 °C (98.7 °F)    Intake/Output Summary (Last 24 hours) at 10/4/2020 1244  Last data filed at 10/4/2020 0501  Gross per 24 hour   Intake 1697.5 ml   Output 575 ml   Net 1122.5 ml       DIET ORDERS (From admission to next 24h)     Start     Ordered    10/04/20 1206  Diet Order Regular  ALL MEALS     Question:  Diet:  Answer:  Regular    10/04/20 1206                    Physical Exam  Nursing note reviewed.   Constitutional:       Appearance: Normal appearance. Alert, oriented x 4.NAD  HENT:      Head: Normocephalic and atraumatic.      Mouth/Throat:      Mouth: Mucous membranes are moist.   Eyes:      Pupils: Pupils are equal, round, and reactive to light.      No scleral Icterus present  Cardiovascular:      Rate and Rhythm: Normal rate and regular rhythm. Extremities warm, well perfused no edema.   Pulmonary:      Effort: Pulmonary effort is normal.      Breath sounds: Normal breath sounds. No wheezes or stridor  Abdominal:      General: Abdomen is soft,  Tender-epigastrium/RUQ. Dressings dry     Recent Labs     10/02/20  2220 10/04/20  0153   SODIUM 139 137   POTASSIUM 3.9 4.4   CHLORIDE 101 102   CO2 26 22   BUN 14 13   CREATININE 1.11 0.86   CALCIUM 9.1 8.6       Recent Labs     10/02/20  2220 10/03/20  1111 10/04/20  0153   ALTSGPT 19  --  34   ASTSGOT 25  --  40   ALKPHOSPHAT 83  --  77   TBILIRUBIN 0.3   --  0.2   LIPASE 461* 62 15   GLUCOSE 104*  --  141*       Recent Labs     10/02/20  2220 10/04/20  0153   RBC 4.55 4.29   HEMOGLOBIN 13.4 12.6   HEMATOCRIT 41.5 39.1   PLATELETCT 159* 139*       Recent Labs     10/02/20  2220 10/04/20  0153   WBC 6.7 7.5   NEUTSPOLYS 42.50*  --    LYMPHOCYTES 41.60*  --    MONOCYTES 10.60  --    EOSINOPHILS 4.20  --    BASOPHILS 1.00  --    ASTSGOT 25 40   ALTSGPT 19 34   ALKPHOSPHAT 83 77   TBILIRUBIN 0.3 0.2         ________________________________________________________________________      Patient Active Problem List   Diagnosis   • HTN (hypertension)   • Fibromyalgia   • Arthritis   • Chronic fatigue disorder   • Cholelithiases   • Gallstone pancreatitis   normal post op exam  LFTs okay, no sign of retained stone  Plan:    Okay to d/c home from surgery standpoint once tolerating PO  Discharge instructions provided in orders

## 2020-10-04 NOTE — DISCHARGE PLANNING
Notified by MD that patient would need a ride back to her personal vehicle. Her vehicle is at [facility]. She was brought to [facility] via ambulance and she is from out of town. Has no assistance back to her vehicle. This RN Case Manager provided a cab voucher for the patient to use back to [facility] to retrieve her car. Updated bedside RN.

## 2020-10-04 NOTE — ANESTHESIA TIME REPORT
Anesthesia Start and Stop Event Times     Date Time Event    10/3/2020 1527 Ready for Procedure     1602 Anesthesia Start     1701 Anesthesia Stop        Responsible Staff  10/03/20    Name Role Begin End    Fernando Chappell M.D. Anesth 1602 1701        Preop Diagnosis (Free Text):  Pre-op Diagnosis     Cholelithiasis        Preop Diagnosis (Codes):    Post op Diagnosis  Gallstone pancreatitis      Premium Reason  E. Weekend    Comments:

## 2020-10-04 NOTE — PROGRESS NOTES
Hospital Medicine Daily Progress Note    Date of Service  10/4/2020    Chief Complaint  60 y.o. female with PMHx of hypertension, SIMONE and MS  admitted 10/2/2020 with complaints of RUQ/epigastric pain made worse with eating over the last 10 days.     Hospital Course    patient admitted overnight with continued RUQ/epigastric pain x 10 days. Was seen at [facility] initially on 9/27. Ultrasound showed chollithiasis without evidence of cholecystitis, however she did leave AMA as they said it would be 4-6 before she could get surgery which was recommended. Patient then came to [facility] and per the chart was found to have a 3cm (? Vs mm) gallstone obstruction (unable to obtain imaging, information per ER note) thus she was sent here for further evaluation and intervention. On arrival here repeat US was done and again showed cholelithiasis but without any evidence of duct obstruction or dilation and no evidence of inflammation. Labs were unremarkable aside from elevated lipase of 461. Patient was placed on IV fluids made NPO and seen by surgery. She underwent laparoscopic cholecystectomy on 10/3 with Dr. Woodall.       Interval Problem Update  Patient seen and examined at bedside, having abdominal pain at surgical site and complains of nausea. She is also concerned that she has not has a BM in 5 days   - advance diet as tolerated  - supportive care with anti-emetics and pain control   - miralax and bowel protocol, encourage ambulation   - okay to d/c per surgery   - discussed dc today which was planned however pt still having pain and nausea and unable to eat well. Will monitor her overnight and plan for dc tomorrow if tolerating diet with less symptoms.     Consultants/Specialty  General surgery - [facility] surgical     Code Status  Full Code    Disposition  Plan dc 10/5    Review of Systems  Review of Systems   Constitutional: Positive for malaise/fatigue. Negative for chills and fever.   HENT: Negative for congestion and sore  throat.    Eyes: Negative for blurred vision and double vision.   Respiratory: Negative for cough and shortness of breath.    Cardiovascular: Negative for chest pain and leg swelling.   Gastrointestinal: Positive for abdominal pain, constipation, heartburn and nausea. Negative for blood in stool, diarrhea, melena and vomiting.   Genitourinary: Negative for dysuria and urgency.   Musculoskeletal: Positive for back pain.   Neurological: Negative for dizziness, focal weakness and headaches.   Psychiatric/Behavioral: Negative for depression and substance abuse.        Physical Exam  Temp:  [36.2 °C (97.2 °F)-37.1 °C (98.7 °F)] 36.4 °C (97.6 °F)  Pulse:  [59-84] 80  Resp:  [13-18] 18  BP: (120-154)/(63-84) 149/71  SpO2:  [92 %-98 %] 93 %    Physical Exam  Constitutional:       Appearance: Normal appearance. She is obese. She is not ill-appearing or toxic-appearing.   HENT:      Head: Normocephalic and atraumatic.      Mouth/Throat:      Mouth: Mucous membranes are moist.      Pharynx: Oropharynx is clear.   Eyes:      Extraocular Movements: Extraocular movements intact.      Pupils: Pupils are equal, round, and reactive to light.   Neck:      Musculoskeletal: Normal range of motion and neck supple.   Cardiovascular:      Rate and Rhythm: Normal rate and regular rhythm.      Pulses: Normal pulses.      Heart sounds: No murmur.   Pulmonary:      Effort: Pulmonary effort is normal. No respiratory distress.      Breath sounds: Normal breath sounds.   Abdominal:      General: Abdomen is flat. Bowel sounds are normal. There is no distension.      Palpations: Abdomen is soft.      Tenderness: There is abdominal tenderness. There is no guarding or rebound.      Comments: Tenderness around surgical sites, dressing Clean and dry   Musculoskeletal: Normal range of motion.         General: No swelling.   Skin:     General: Skin is warm and dry.      Capillary Refill: Capillary refill takes less than 2 seconds.   Neurological:       General: No focal deficit present.      Mental Status: She is alert and oriented to person, place, and time.      Cranial Nerves: No cranial nerve deficit.   Psychiatric:         Mood and Affect: Mood normal.         Behavior: Behavior normal.         Thought Content: Thought content normal.         Fluids    Intake/Output Summary (Last 24 hours) at 10/4/2020 1429  Last data filed at 10/4/2020 0501  Gross per 24 hour   Intake 1697.5 ml   Output 575 ml   Net 1122.5 ml       Laboratory  Recent Labs     10/02/20  2220 10/04/20  0153   WBC 6.7 7.5   RBC 4.55 4.29   HEMOGLOBIN 13.4 12.6   HEMATOCRIT 41.5 39.1   MCV 91.2 91.1   MCH 29.5 29.4   MCHC 32.3* 32.2*   RDW 44.4 44.4   PLATELETCT 159* 139*   MPV 10.5 10.5     Recent Labs     10/02/20  2220 10/04/20  0153   SODIUM 139 137   POTASSIUM 3.9 4.4   CHLORIDE 101 102   CO2 26 22   GLUCOSE 104* 141*   BUN 14 13   CREATININE 1.11 0.86   CALCIUM 9.1 8.6                   Imaging  US-RUQ   Final Result         1.  Echogenic liver compatible with fatty change versus fibrosis.   2.  Cholelithiasis without additional sonographic findings of acute cholecystitis.   3.  Cyst within the right kidney lower pole without visualized complex features.           Assessment/Plan  * Gallstone pancreatitis  Assessment & Plan  -Initial lipase 461, now normalized all other labs within normal  -Questionable 3 mm stone seen on outside imaging at [facility], not seen on imaging at [facility] or US done here    - s/p laparoscopic cholecystectomy 10/3  - advance diet   - supportive care with Pain and nausea control PRN    Cholelithiases  Assessment & Plan  -Cholelithiasis without cholecystitis.  Common bile duct was 6.4 mm on our imaging   -There was questionable 3 mm stone seen on outside facility.  -There is no elevation on LFTs or bilirubin levels.  - s/p lap minesh, clear for DC per surgery     HTN (hypertension)- (present on admission)  Assessment & Plan  -Continue atenolol, losartan  hydrochlorothiazide.  - stable     Fibromyalgia- (present on admission)  Assessment & Plan  -Continue citalopram and Paxil  - supportive care with pain control for acute issue       VTE prophylaxis: SCDs

## 2020-10-04 NOTE — CARE PLAN
Problem: Communication  Goal: The ability to communicate needs accurately and effectively will improve  Outcome: PROGRESSING AS EXPECTED     Problem: Safety  Goal: Will remain free from injury  Outcome: PROGRESSING AS EXPECTED     Problem: Safety  Goal: Will remain free from falls  Outcome: PROGRESSING AS EXPECTED     Problem: Pain Management  Goal: Pain level will decrease to patient's comfort goal  Outcome: PROGRESSING AS EXPECTED     Problem: Mobility  Goal: Risk for activity intolerance will decrease  Description: Pt was OOB to the bathroom w/ standby assistance    Outcome: PROGRESSING AS EXPECTED

## 2020-10-04 NOTE — PROGRESS NOTES
Pt arrived to unit using NC @2L. Pt is A&Ox4. Offering no complaints at this time. Monitoring vital signs. 4 lap sites w/ gauze and tegaderm. One site has a small about of drainage on the gauze.

## 2020-10-04 NOTE — ANESTHESIA POSTPROCEDURE EVALUATION
Patient: Terrie Scott    Procedure Summary     Date: 10/03/20 Room / Location: [place] [address] / SURGERY [facility]    Anesthesia Start: 1602 Anesthesia Stop: 1701    Procedure: CHOLECYSTECTOMY, LAPAROSCOPIC (N/A Abdomen) Diagnosis: (Cholelithiasis)    Surgeon: Rossi Woodall M.D. Responsible Provider: Fernando Chappell M.D.    Anesthesia Type: general ASA Status: 4          Final Anesthesia Type: general  Last vitals  BP   Blood Pressure: 129/75    Temp   36.4 °C (97.6 °F)    Pulse   Pulse: 79   Resp   16    SpO2   95 %      Anesthesia Post Evaluation    Patient location during evaluation: PACU  Patient participation: complete - patient participated  Level of consciousness: awake and alert  Pain score: 0    Airway patency: patent  Anesthetic complications: no  Cardiovascular status: hemodynamically stable  Respiratory status: acceptable  Hydration status: euvolemic    PONV: none           Nurse Pain Score: 10 (NPRS)

## 2020-10-04 NOTE — OR NURSING
Pt to recovery room, AOx4, VSS, abd dressing CDI, ice pack to the site No complaints of nausea, pain is tolerable. Family updated.

## 2020-10-05 ENCOUNTER — APPOINTMENT (OUTPATIENT)
Dept: RADIOLOGY | Facility: MEDICAL CENTER | Age: 60
DRG: 418 | End: 2020-10-05
Attending: STUDENT IN AN ORGANIZED HEALTH CARE EDUCATION/TRAINING PROGRAM
Payer: COMMERCIAL

## 2020-10-05 LAB — PATHOLOGY CONSULT NOTE: NORMAL

## 2020-10-05 PROCEDURE — 700102 HCHG RX REV CODE 250 W/ 637 OVERRIDE(OP): Performed by: STUDENT IN AN ORGANIZED HEALTH CARE EDUCATION/TRAINING PROGRAM

## 2020-10-05 PROCEDURE — A9270 NON-COVERED ITEM OR SERVICE: HCPCS | Performed by: HOSPITALIST

## 2020-10-05 PROCEDURE — 700102 HCHG RX REV CODE 250 W/ 637 OVERRIDE(OP): Performed by: NURSE PRACTITIONER

## 2020-10-05 PROCEDURE — 90686 IIV4 VACC NO PRSV 0.5 ML IM: CPT | Performed by: STUDENT IN AN ORGANIZED HEALTH CARE EDUCATION/TRAINING PROGRAM

## 2020-10-05 PROCEDURE — 700101 HCHG RX REV CODE 250: Performed by: STUDENT IN AN ORGANIZED HEALTH CARE EDUCATION/TRAINING PROGRAM

## 2020-10-05 PROCEDURE — 700102 HCHG RX REV CODE 250 W/ 637 OVERRIDE(OP): Performed by: HOSPITALIST

## 2020-10-05 PROCEDURE — 74018 RADEX ABDOMEN 1 VIEW: CPT

## 2020-10-05 PROCEDURE — A9270 NON-COVERED ITEM OR SERVICE: HCPCS | Performed by: NURSE PRACTITIONER

## 2020-10-05 PROCEDURE — 700111 HCHG RX REV CODE 636 W/ 250 OVERRIDE (IP): Performed by: STUDENT IN AN ORGANIZED HEALTH CARE EDUCATION/TRAINING PROGRAM

## 2020-10-05 PROCEDURE — 99232 SBSQ HOSP IP/OBS MODERATE 35: CPT | Performed by: STUDENT IN AN ORGANIZED HEALTH CARE EDUCATION/TRAINING PROGRAM

## 2020-10-05 PROCEDURE — 90471 IMMUNIZATION ADMIN: CPT

## 2020-10-05 PROCEDURE — 770006 HCHG ROOM/CARE - MED/SURG/GYN SEMI*

## 2020-10-05 PROCEDURE — 3E02340 INTRODUCTION OF INFLUENZA VACCINE INTO MUSCLE, PERCUTANEOUS APPROACH: ICD-10-PCS | Performed by: INTERNAL MEDICINE

## 2020-10-05 PROCEDURE — A9270 NON-COVERED ITEM OR SERVICE: HCPCS | Performed by: STUDENT IN AN ORGANIZED HEALTH CARE EDUCATION/TRAINING PROGRAM

## 2020-10-05 RX ORDER — POLYETHYLENE GLYCOL 3350 17 G/17G
1 POWDER, FOR SOLUTION ORAL DAILY
Status: DISCONTINUED | OUTPATIENT
Start: 2020-10-05 | End: 2020-10-06 | Stop reason: HOSPADM

## 2020-10-05 RX ORDER — BISACODYL 10 MG
10 SUPPOSITORY, RECTAL RECTAL
Status: DISCONTINUED | OUTPATIENT
Start: 2020-10-05 | End: 2020-10-06 | Stop reason: HOSPADM

## 2020-10-05 RX ORDER — AMOXICILLIN 250 MG
2 CAPSULE ORAL 2 TIMES DAILY
Status: DISCONTINUED | OUTPATIENT
Start: 2020-10-05 | End: 2020-10-06 | Stop reason: HOSPADM

## 2020-10-05 RX ADMIN — OXYCODONE 5 MG: 5 TABLET ORAL at 13:46

## 2020-10-05 RX ADMIN — FAMOTIDINE 20 MG: 20 TABLET, FILM COATED ORAL at 18:14

## 2020-10-05 RX ADMIN — POLYETHYLENE GLYCOL 3350 1 PACKET: 17 POWDER, FOR SOLUTION ORAL at 12:19

## 2020-10-05 RX ADMIN — FAMOTIDINE 20 MG: 20 TABLET, FILM COATED ORAL at 08:58

## 2020-10-05 RX ADMIN — TIZANIDINE 4 MG: 4 TABLET ORAL at 18:19

## 2020-10-05 RX ADMIN — INFLUENZA A VIRUS A/GUANGDONG-MAONAN/SWL1536/2019 CNIC-1909 (H1N1) ANTIGEN (FORMALDEHYDE INACTIVATED), INFLUENZA A VIRUS A/HONG KONG/2671/2019 (H3N2) ANTIGEN (FORMALDEHYDE INACTIVATED), INFLUENZA B VIRUS B/PHUKET/3073/2013 ANTIGEN (FORMALDEHYDE INACTIVATED), AND INFLUENZA B VIRUS B/WASHINGTON/02/2019 ANTIGEN (FORMALDEHYDE INACTIVATED) 0.5 ML: 15; 15; 15; 15 INJECTION, SUSPENSION INTRAMUSCULAR at 20:22

## 2020-10-05 RX ADMIN — HYDROCHLOROTHIAZIDE 25 MG: 25 TABLET ORAL at 08:59

## 2020-10-05 RX ADMIN — DOCUSATE SODIUM 50 MG AND SENNOSIDES 8.6 MG 2 TABLET: 8.6; 5 TABLET, FILM COATED ORAL at 18:14

## 2020-10-05 RX ADMIN — LOSARTAN POTASSIUM 100 MG: 50 TABLET, FILM COATED ORAL at 08:59

## 2020-10-05 RX ADMIN — OXYCODONE 5 MG: 5 TABLET ORAL at 18:14

## 2020-10-05 RX ADMIN — OXYCODONE 5 MG: 5 TABLET ORAL at 09:00

## 2020-10-05 ASSESSMENT — ENCOUNTER SYMPTOMS
DEPRESSION: 0
CONSTIPATION: 1
BACK PAIN: 1
BLOOD IN STOOL: 0
NAUSEA: 1
VOMITING: 0
COUGH: 0
ABDOMINAL PAIN: 1
HEADACHES: 0
DIARRHEA: 0
DOUBLE VISION: 0
FOCAL WEAKNESS: 0
BLURRED VISION: 0
SORE THROAT: 0
DIZZINESS: 0
SHORTNESS OF BREATH: 0
CHILLS: 0
HEARTBURN: 1
FEVER: 0

## 2020-10-05 ASSESSMENT — PAIN DESCRIPTION - PAIN TYPE
TYPE: ACUTE PAIN

## 2020-10-05 ASSESSMENT — LIFESTYLE VARIABLES: SUBSTANCE_ABUSE: 0

## 2020-10-05 NOTE — PROGRESS NOTES
Pt is A&Ox4. VSS. Lungs clear diminished on 2LNC for SIMONE. Pt c/o pain 6/10 in the lap sites. Pt slowly tolerating diet, encourage pt to increase PO intake as possible. Pt is urinating well. Oxycodone 5mg and tylenol for pain PRN. Lap sites x4 to abd c/d/i with dermabond and Tegaderm with scant drainage to upper lap site. SCDs on BLE. Bed low and locked, call light within reach, safety precautions in place, hourly rounding, WCM.

## 2020-10-05 NOTE — PROGRESS NOTES
"Pt stated that she is feeling \"whozzy\" and having cramping to her legs. She notified this RN that she hasn't been able to eat much and feels nauseated after eating. Pt will be by herself the next couple days then will drive to [place]. She will not cook for herself at home. MD notified and patient to stay over night and re-assess for discharge in AM.  "

## 2020-10-05 NOTE — CARE PLAN
Problem: Communication  Goal: The ability to communicate needs accurately and effectively will improve  Outcome: PROGRESSING AS EXPECTED     Problem: Safety  Goal: Will remain free from injury  Outcome: PROGRESSING AS EXPECTED     Problem: Safety  Goal: Will remain free from falls  Outcome: PROGRESSING AS EXPECTED     Problem: Discharge Barriers/Planning  Goal: Patient's continuum of care needs will be met  Outcome: PROGRESSING AS EXPECTED     Problem: Pain Management  Goal: Pain level will decrease to patient's comfort goal  Outcome: PROGRESSING AS EXPECTED

## 2020-10-05 NOTE — PROGRESS NOTES
Hospital Medicine Daily Progress Note    Date of Service  10/5/2020    Chief Complaint  60 y.o. female with PMHx of hypertension, SIMONE and MS  admitted 10/2/2020 with complaints of RUQ/epigastric pain made worse with eating over the last 10 days.     Hospital Course    patient admitted overnight with continued RUQ/epigastric pain x 10 days. Was seen at [facility] initially on 9/27. Ultrasound showed chollithiasis without evidence of cholecystitis, however she did leave AMA as they said it would be 4-6 before she could get surgery which was recommended. Patient then came to [facility] and per the chart was found to have a 3cm (? Vs mm) gallstone obstruction (unable to obtain imaging, information per ER note) thus she was sent here for further evaluation and intervention. On arrival here repeat US was done and again showed cholelithiasis but without any evidence of duct obstruction or dilation and no evidence of inflammation. Labs were unremarkable aside from elevated lipase of 461. Patient was placed on IV fluids made NPO and seen by surgery. She underwent laparoscopic cholecystectomy on 10/3 with Dr. Woodall.       Interval Problem Update  Patient seen and examined at bedside, continues to have abdominal pain, states worse than yesterday feels bloated, denies flatus or BM   - not tolerating diet due to nausea and pain   - abdomen soft, bowel sounds present, will order abdominal xray, possible post-op ileus   - diet as tolerated, Bowel protocol   - supportive care with anti-emetics and pain control, limit narcotics to avoid further gut slowing   .     Consultants/Specialty  General surgery - [facility] surgical     Code Status  Full Code    Disposition  Anticipate dc 10/6    Review of Systems  Review of Systems   Constitutional: Positive for malaise/fatigue. Negative for chills and fever.   HENT: Negative for congestion and sore throat.    Eyes: Negative for blurred vision and double vision.   Respiratory: Negative for cough and  shortness of breath.    Cardiovascular: Negative for chest pain and leg swelling.   Gastrointestinal: Positive for abdominal pain, constipation, heartburn and nausea. Negative for blood in stool, diarrhea, melena and vomiting.   Genitourinary: Negative for dysuria and urgency.   Musculoskeletal: Positive for back pain.   Neurological: Negative for dizziness, focal weakness and headaches.   Psychiatric/Behavioral: Negative for depression and substance abuse.        Physical Exam  Temp:  [36.4 °C (97.5 °F)-36.8 °C (98.3 °F)] 36.8 °C (98.3 °F)  Pulse:  [69-80] 80  Resp:  [16-20] 16  BP: (126-196)/() 164/83  SpO2:  [92 %-95 %] 94 %    Physical Exam  Constitutional:       Appearance: Normal appearance. She is obese. She is not ill-appearing or toxic-appearing.   HENT:      Head: Normocephalic and atraumatic.      Mouth/Throat:      Mouth: Mucous membranes are moist.      Pharynx: Oropharynx is clear.   Eyes:      Extraocular Movements: Extraocular movements intact.      Pupils: Pupils are equal, round, and reactive to light.   Neck:      Musculoskeletal: Normal range of motion and neck supple.   Cardiovascular:      Rate and Rhythm: Normal rate and regular rhythm.      Pulses: Normal pulses.      Heart sounds: No murmur.   Pulmonary:      Effort: Pulmonary effort is normal. No respiratory distress.      Breath sounds: Normal breath sounds.   Abdominal:      General: Abdomen is flat. Bowel sounds are normal. There is no distension.      Palpations: Abdomen is soft.      Tenderness: There is abdominal tenderness. There is guarding. There is no rebound.      Comments: Tenderness around surgical sites, dressing Clean and dry   Musculoskeletal: Normal range of motion.         General: No swelling.   Skin:     General: Skin is warm and dry.      Capillary Refill: Capillary refill takes less than 2 seconds.   Neurological:      General: No focal deficit present.      Mental Status: She is alert and oriented to person,  place, and time.      Cranial Nerves: No cranial nerve deficit.   Psychiatric:         Mood and Affect: Mood normal.         Behavior: Behavior normal.         Thought Content: Thought content normal.         Fluids    Intake/Output Summary (Last 24 hours) at 10/5/2020 1512  Last data filed at 10/5/2020 0800  Gross per 24 hour   Intake 400 ml   Output --   Net 400 ml       Laboratory  Recent Labs     10/02/20  2220 10/04/20  0153   WBC 6.7 7.5   RBC 4.55 4.29   HEMOGLOBIN 13.4 12.6   HEMATOCRIT 41.5 39.1   MCV 91.2 91.1   MCH 29.5 29.4   MCHC 32.3* 32.2*   RDW 44.4 44.4   PLATELETCT 159* 139*   MPV 10.5 10.5     Recent Labs     10/02/20  2220 10/04/20  0153   SODIUM 139 137   POTASSIUM 3.9 4.4   CHLORIDE 101 102   CO2 26 22   GLUCOSE 104* 141*   BUN 14 13   CREATININE 1.11 0.86   CALCIUM 9.1 8.6                   Imaging  OUTSIDE IMAGES-US ABDOMEN   Final Result      US-RUQ   Final Result         1.  Echogenic liver compatible with fatty change versus fibrosis.   2.  Cholelithiasis without additional sonographic findings of acute cholecystitis.   3.  Cyst within the right kidney lower pole without visualized complex features.      [id number] VIEW    (Results Pending)        Assessment/Plan  * Gallstone pancreatitis  Assessment & Plan  -Initial lipase 461, now normalized all other labs within normal  -Questionable 3 mm stone seen on outside imaging at [facility], not seen on imaging at [facility] or US done here    - s/p laparoscopic cholecystectomy 10/3  - lipase normal   - advance diet   - supportive care with Pain and nausea control PRN    Cholelithiases  Assessment & Plan  -Cholelithiasis without cholecystitis.  Common bile duct was 6.4 mm on our imaging   -There was questionable 3 mm stone seen on outside facility.  -There is no elevation on LFTs or bilirubin levels.  - s/p lap minesh, clear for DC per surgery   - advance diet, pain/nasuea control     HTN (hypertension)- (present on admission)  Assessment &  Plan  -Continue atenolol, losartan hydrochlorothiazide.  - stable     Fibromyalgia- (present on admission)  Assessment & Plan  -Continue citalopram and Paxil  - supportive care with pain control for acute issue       VTE prophylaxis: SCDs

## 2020-10-05 NOTE — CARE PLAN
Problem: Venous Thromboembolism (VTW)/Deep Vein Thrombosis (DVT) Prevention:  Goal: Patient will participate in Venous Thrombosis (VTE)/Deep Vein Thrombosis (DVT)Prevention Measures  Outcome: PROGRESSING AS EXPECTED  Intervention: Encourage ambulation/mobilization at level directed by Physical Therapy in collaboration with Interdisciplinary Team  Note: Mechanical prophylaxis - pt is ambulatory      Problem: Bowel/Gastric:  Goal: Normal bowel function is maintained or improved  Outcome: PROGRESSING AS EXPECTED   Pt encouraged to ambulate, taking bowel protocol medications as ordered

## 2020-10-05 NOTE — PROGRESS NOTES
Bedside report received at change of shift, assumed care of pt. No immediate needs.    Pt is axox 4. Reporting pain of 8/10 in the abdomen, medication given per MAR.  Pt is mildly distended, denies any bowel movement, denies any flatus. Pt reports poor appetite, poor nutritional intake. Agrees to take majority of medications but refusing some of them due to nausea. Plan of care reviewed, patient board updated, environmental safety precautions in place, and frequent rounding in practice.

## 2020-10-05 NOTE — CARE PLAN
Problem: Pain Management  Goal: Pain level will decrease to patient's comfort goal  Outcome: PROGRESSING AS EXPECTED  Intervention: Follow pain managment plan developed in collaboration with patient and Interdisciplinary Team  Note: Oxycodone 5 mg PRN Q4 moderately controls pain.     Problem: Infection  Goal: Will remain free from infection  Note: Pt afberile. No s/s infection .Old dried drainage of blood to a couple of bandages.

## 2020-10-06 ENCOUNTER — PATIENT OUTREACH (OUTPATIENT)
Dept: HEALTH INFORMATION MANAGEMENT | Facility: OTHER | Age: 60
End: 2020-10-06

## 2020-10-06 VITALS
HEART RATE: 70 BPM | SYSTOLIC BLOOD PRESSURE: 160 MMHG | TEMPERATURE: 97.7 F | OXYGEN SATURATION: 91 % | BODY MASS INDEX: 39.82 KG/M2 | RESPIRATION RATE: 16 BRPM | HEIGHT: 66 IN | DIASTOLIC BLOOD PRESSURE: 90 MMHG | WEIGHT: 247.8 LBS

## 2020-10-06 LAB
ALBUMIN SERPL BCP-MCNC: 3.9 G/DL (ref 3.2–4.9)
ALBUMIN/GLOB SERPL: 1.3 G/DL
ALP SERPL-CCNC: 82 U/L (ref 30–99)
ALT SERPL-CCNC: 30 U/L (ref 2–50)
ANION GAP SERPL CALC-SCNC: 11 MMOL/L (ref 7–16)
AST SERPL-CCNC: 27 U/L (ref 12–45)
BILIRUB SERPL-MCNC: 0.6 MG/DL (ref 0.1–1.5)
BUN SERPL-MCNC: 10 MG/DL (ref 8–22)
CALCIUM SERPL-MCNC: 9.4 MG/DL (ref 8.5–10.5)
CHLORIDE SERPL-SCNC: 100 MMOL/L (ref 96–112)
CO2 SERPL-SCNC: 28 MMOL/L (ref 20–33)
CREAT SERPL-MCNC: 0.77 MG/DL (ref 0.5–1.4)
GLOBULIN SER CALC-MCNC: 3.1 G/DL (ref 1.9–3.5)
GLUCOSE SERPL-MCNC: 126 MG/DL (ref 65–99)
POTASSIUM SERPL-SCNC: 3.5 MMOL/L (ref 3.6–5.5)
PROT SERPL-MCNC: 7 G/DL (ref 6–8.2)
SODIUM SERPL-SCNC: 139 MMOL/L (ref 135–145)

## 2020-10-06 PROCEDURE — A9270 NON-COVERED ITEM OR SERVICE: HCPCS | Performed by: HOSPITALIST

## 2020-10-06 PROCEDURE — 700111 HCHG RX REV CODE 636 W/ 250 OVERRIDE (IP): Performed by: HOSPITALIST

## 2020-10-06 PROCEDURE — 700102 HCHG RX REV CODE 250 W/ 637 OVERRIDE(OP): Performed by: HOSPITALIST

## 2020-10-06 PROCEDURE — A9270 NON-COVERED ITEM OR SERVICE: HCPCS | Performed by: NURSE PRACTITIONER

## 2020-10-06 PROCEDURE — 80053 COMPREHEN METABOLIC PANEL: CPT

## 2020-10-06 PROCEDURE — A9270 NON-COVERED ITEM OR SERVICE: HCPCS | Performed by: STUDENT IN AN ORGANIZED HEALTH CARE EDUCATION/TRAINING PROGRAM

## 2020-10-06 PROCEDURE — 700102 HCHG RX REV CODE 250 W/ 637 OVERRIDE(OP): Performed by: NURSE PRACTITIONER

## 2020-10-06 PROCEDURE — 700102 HCHG RX REV CODE 250 W/ 637 OVERRIDE(OP): Performed by: STUDENT IN AN ORGANIZED HEALTH CARE EDUCATION/TRAINING PROGRAM

## 2020-10-06 PROCEDURE — 99239 HOSP IP/OBS DSCHRG MGMT >30: CPT | Performed by: INTERNAL MEDICINE

## 2020-10-06 PROCEDURE — 700101 HCHG RX REV CODE 250: Performed by: STUDENT IN AN ORGANIZED HEALTH CARE EDUCATION/TRAINING PROGRAM

## 2020-10-06 RX ORDER — AMOXICILLIN 250 MG
1 CAPSULE ORAL 2 TIMES DAILY
Qty: 30 TAB | Refills: 0 | Status: SHIPPED | OUTPATIENT
Start: 2020-10-06

## 2020-10-06 RX ADMIN — HYDROCHLOROTHIAZIDE 25 MG: 25 TABLET ORAL at 04:18

## 2020-10-06 RX ADMIN — LOSARTAN POTASSIUM 100 MG: 50 TABLET, FILM COATED ORAL at 04:17

## 2020-10-06 RX ADMIN — OXYCODONE 5 MG: 5 TABLET ORAL at 09:04

## 2020-10-06 RX ADMIN — FAMOTIDINE 20 MG: 20 TABLET, FILM COATED ORAL at 04:18

## 2020-10-06 RX ADMIN — CETIRIZINE HYDROCHLORIDE 10 MG: 10 TABLET, FILM COATED ORAL at 04:18

## 2020-10-06 RX ADMIN — DOCUSATE SODIUM 50 MG AND SENNOSIDES 8.6 MG 2 TABLET: 8.6; 5 TABLET, FILM COATED ORAL at 04:17

## 2020-10-06 RX ADMIN — POLYETHYLENE GLYCOL 3350 1 PACKET: 17 POWDER, FOR SOLUTION ORAL at 04:17

## 2020-10-06 RX ADMIN — OXYBUTYNIN CHLORIDE 10 MG: 5 TABLET, EXTENDED RELEASE ORAL at 04:17

## 2020-10-06 ASSESSMENT — PAIN DESCRIPTION - PAIN TYPE
TYPE: ACUTE PAIN
TYPE: ACUTE PAIN

## 2020-10-06 NOTE — CARE PLAN
Problem: Communication  Goal: The ability to communicate needs accurately and effectively will improve  Outcome: MET     Problem: Safety  Goal: Will remain free from injury  Outcome: MET  Goal: Will remain free from falls  Outcome: MET     Problem: Infection  Goal: Will remain free from infection  Outcome: MET     Problem: Venous Thromboembolism (VTW)/Deep Vein Thrombosis (DVT) Prevention:  Goal: Patient will participate in Venous Thrombosis (VTE)/Deep Vein Thrombosis (DVT)Prevention Measures  Outcome: MET     Problem: Bowel/Gastric:  Goal: Normal bowel function is maintained or improved  Outcome: MET  Goal: Will not experience complications related to bowel motility  Outcome: MET     Problem: Knowledge Deficit  Goal: Knowledge of disease process/condition, treatment plan, diagnostic tests, and medications will improve  Description: Pt verbalized understanding treatment plan.   Outcome: MET  Goal: Knowledge of the prescribed therapeutic regimen will improve  Outcome: MET     Problem: Discharge Barriers/Planning  Goal: Patient's continuum of care needs will be met  Outcome: MET     Problem: Pain Management  Goal: Pain level will decrease to patient's comfort goal  Outcome: MET     Problem: Mobility  Goal: Risk for activity intolerance will decrease  Description: Pt was OOB to the bathroom w/ standby assistance    Outcome: MET

## 2020-10-06 NOTE — DISCHARGE INSTRUCTIONS
Discharge Instructions    Discharged to home by car with self. Discharged via walking, hospital escort: Yes.  Special equipment needed: Not Applicable    Be sure to schedule a follow-up appointment with your primary care doctor or any specialists as instructed.     Discharge Plan:   Diet Plan: Discussed - regular  Activity Level: Discussed - activity as tolerated, follow physician instructions.   Confirmed Follow up Appointment: Patient to Call and Schedule Appointment  Confirmed Symptoms Management: Discussed  Medication Reconciliation Updated: Yes  Influenza Vaccine Indication: Indicated: 9 to 64 years of age  Influenza Vaccine Given - only chart on this line when given: Influenza Vaccine Given     I understand that a diet low in cholesterol, fat, and sodium is recommended for good health. Unless I have been given specific instructions below for another diet, I accept this instruction as my diet prescription.   Other diet: regular diet    Special Instructions:   discharge Home, Low fat diet x 3 weeks Ok To Shower, keep incisions as dry as possible, do not submerge. Keep Plastic dressings in place for three days Take over the counter stool softeners as needed for constipation No strenuous activity of lifting >20 lbs for six weeks. Follow up with Dr. Woodall in office next week    · Is patient discharged on Warfarin / Coumadin?   No     Depression / Suicide Risk    As you are discharged from this [facility] facility, it is important to learn how to keep safe from harming yourself.    Recognize the warning signs:  · Abrupt changes in personality, positive or negative- including increase in energy   · Giving away possessions  · Change in eating patterns- significant weight changes-  positive or negative  · Change in sleeping patterns- unable to sleep or sleeping all the time   · Unwillingness or inability to communicate  · Depression  · Unusual sadness, discouragement and loneliness  · Talk of wanting to  die  · Neglect of personal appearance   · Rebelliousness- reckless behavior  · Withdrawal from people/activities they love  · Confusion- inability to concentrate     If you or a loved one observes any of these behaviors or has concerns about self-harm, here's what you can do:  · Talk about it- your feelings and reasons for harming yourself  · Remove any means that you might use to hurt yourself (examples: pills, rope, extension cords, firearm)  · Get professional help from the community (Mental Health, Substance Abuse, psychological counseling)  · Do not be alone:Call your Safe Contact- someone whom you trust who will be there for you.  · Call your local CRISIS HOTLINE 115-1196 or 272-059-1243  · Call your local Children's Mobile Crisis Response Team [place] (286) 813-1955 or www.[employer]  · Call the toll free National Suicide Prevention Hotlines   · National Suicide Prevention Lifeline 777-947-LAZU (5931)  · [employer] 800-SUICIDE (002-7558)      Laparoscopic Cholecystectomy, Care After  This sheet gives you information about how to care for yourself after your procedure. Your doctor may also give you more specific instructions. If you have problems or questions, contact your doctor.  Follow these instructions at home:  Care for cuts from surgery (incisions)    · Follow instructions from your doctor about how to take care of your cuts from surgery. Make sure you:  ? Wash your hands with soap and water before you change your bandage (dressing). If you cannot use soap and water, use hand .  ? Change your bandage as told by your doctor.  ? Leave stitches (sutures), skin glue, or skin tape (adhesive) strips in place. They may need to stay in place for 2 weeks or longer. If tape strips get loose and curl up, you may trim the loose edges. Do not remove tape strips completely unless your doctor says it is okay.  · Do not take baths, swim, or use a hot tub until your doctor says it is  okay. Ask your doctor if you can take showers. You may only be allowed to take sponge baths for bathing.  · Check your surgical cut area every day for signs of infection. Check for:  ? More redness, swelling, or pain.  ? More fluid or blood.  ? Warmth.  ? Pus or a bad smell.  Activity  · Do not drive or use heavy machinery while taking prescription pain medicine.  · Do not lift anything that is heavier than 10 lb (4.5 kg) until your doctor says it is okay.  · Do not play contact sports until your doctor says it is okay.  · Do not drive for 24 hours if you were given a medicine to help you relax (sedative).  · Rest as needed. Do not return to work or school until your doctor says it is okay.  General instructions  · Take over-the-counter and prescription medicines only as told by your doctor.  · To prevent or treat constipation while you are taking prescription pain medicine, your doctor may recommend that you:  ? Drink enough fluid to keep your pee (urine) clear or pale yellow.  ? Take over-the-counter or prescription medicines.  ? Eat foods that are high in fiber, such as fresh fruits and vegetables, whole grains, and beans.  ? Limit foods that are high in fat and processed sugars, such as fried and sweet foods.  Contact a doctor if:  · You develop a rash.  · You have more redness, swelling, or pain around your surgical cuts.  · You have more fluid or blood coming from your surgical cuts.  · Your surgical cuts feel warm to the touch.  · You have pus or a bad smell coming from your surgical cuts.  · You have a fever.  · One or more of your surgical cuts breaks open.  Get help right away if:  · You have trouble breathing.  · You have chest pain.  · You have pain that is getting worse in your shoulders.  · You faint or feel dizzy when you stand.  · You have very bad pain in your belly (abdomen).  · You are sick to your stomach (nauseous) for more than one day.  · You have throwing up (vomiting) that lasts for more  than one day.  · You have leg pain.  This information is not intended to replace advice given to you by your health care provider. Make sure you discuss any questions you have with your health care provider.  Document Released: 09/26/2009 Document Revised: 11/30/2018 Document Reviewed: 06/05/2017  Elsevier Patient Education © 2020 [employer]

## 2020-10-06 NOTE — DISCHARGE SUMMARY
Discharge Summary    CHIEF COMPLAINT ON ADMISSION  Chief Complaint   Patient presents with   • Abdominal Pain     pt is transfer from [facility] pt went in for abdominal painx 10 days. Pt has 3cm stone in CBD. pt sent here for ERCP       Reason for Admission  EMS     Admission Date  10/2/2020    CODE STATUS  Full Code    HPI & HOSPITAL COURSE  This is a 60 y.o. female here with above medical issues.  General surgery was consulted. Her labs normalized and her RUQ US showed no evidence of a dilated CBD. She underwent the following procedure:    PREOPERATIVE DIAGNOSES:  1.  Cholecystitis.  2.  Gallstone pancreatitis.     POSTOPERATIVE DIAGNOSES:    1.  Cholecystitis.  2.  Gallstone pancreatitis.     PROCEDURE:  Laparoscopic cholecystectomy.    She tolerated the procedure well. She had some mild post operative ileus that self resolved. Her pain was well controlled and she was able to tolerate oral intake without issue on day of discharge. Pathology is pending and she will need 2 week post operative follow up with her surgeon.          Therefore, she is discharged in fair and stable condition to home with close outpatient follow-up.    The patient met 2-midnight criteria for an inpatient stay at the time of discharge.    Discharge Date  10/6/2020    FOLLOW UP ITEMS POST DISCHARGE  F/U with Dr Woodall in 2 weeks  F/U with her PCP in 2-3 weeks    DISCHARGE DIAGNOSES  Principal Problem:    Gallstone pancreatitis POA: Unknown  Active Problems:    Cholelithiases POA: Unknown    HTN (hypertension) POA: Yes    Fibromyalgia POA: Yes  Resolved Problems:    * No resolved hospital problems. *      FOLLOW UP  No future appointments.  No follow-up provider specified.    MEDICATIONS ON DISCHARGE     Medication List      START taking these medications      Instructions   senna-docusate 8.6-50 MG Tabs  Commonly known as: PERICOLACE or SENOKOT S   Take 1 Tab by mouth 2 Times a Day.  Dose: 1 Tab        CONTINUE taking these medications       Instructions   ALPRAZolam 0.25 MG Tabs  Commonly known as: XANAX   Take 0.25 mg by mouth 1 time daily as needed.  Dose: 0.25 mg     cetirizine 10 MG Tabs  Commonly known as: ZYRTEC   Take 1 Tab by mouth every day.  Dose: 10 mg     Ditropan XL 10 MG CR tablet  Generic drug: oxybutynin SR   Take 10 mg by mouth every day.  Dose: 10 mg     famotidine 40 MG Tabs  Commonly known as: PEPCID   Take 1 Tab by mouth 2 times a day.  Dose: 40 mg     hydroCHLOROthiazide 25 MG Tabs  Commonly known as: HYDRODIURIL   Take 25 mg by mouth every day. Indications: High Blood Pressure Disorder  Dose: 25 mg     hydrocodone-acetaminophen 5-500 MG Tabs  Commonly known as: VICODIN   Take 1 Tab by mouth every 6 hours as needed.  Dose: 1 Tab     ketoconazole 2 % Crea  Commonly known as: NIZORAL   To hand rash for 4 weeks     losartan 50 MG Tabs  Commonly known as: COZAAR   Take 100 mg by mouth every day. Indications: High Blood Pressure Disorder  Dose: 100 mg     tizanidine 4 MG Tabs  Commonly known as: ZANAFLEX   Take 4 mg by mouth 2 times a day as needed.  Dose: 4 mg            Allergies  No Known Allergies    DIET  Orders Placed This Encounter   Procedures   • Diet Order Regular     Standing Status:   Standing     Number of Occurrences:   1     Order Specific Question:   Diet:     Answer:   Regular [1]       ACTIVITY  As tolerated.  Weight bearing as tolerated    CONSULTATIONS  General surgery    PROCEDURES  As noted above    [id number] VIEW   Final Result      No evidence of bowel obstruction. No free air is identified.      OUTSIDE IMAGES-US ABDOMEN   Final Result      US-RUQ   Final Result         1.  Echogenic liver compatible with fatty change versus fibrosis.   2.  Cholelithiasis without additional sonographic findings of acute cholecystitis.   3.  Cyst within the right kidney lower pole without visualized complex features.            LABORATORY  Lab Results   Component Value Date    SODIUM 139 10/06/2020    POTASSIUM 3.5 (L)  10/06/2020    CHLORIDE 100 10/06/2020    CO2 28 10/06/2020    GLUCOSE 126 (H) 10/06/2020    BUN 10 10/06/2020    CREATININE 0.77 10/06/2020        Lab Results   Component Value Date    WBC 7.5 10/04/2020    HEMOGLOBIN 12.6 10/04/2020    HEMATOCRIT 39.1 10/04/2020    PLATELETCT 139 (L) 10/04/2020        Total time of the discharge process exceeds 43 minutes.

## 2020-10-06 NOTE — PROGRESS NOTES
Received bedside report and accepted care of patient.    Pt is A/Ox4, VSS, pt is currently on RA with no sign of distress, discomfort, or pain. Pt is tolerating diet. Lap sites x4 to abd c/d/i with dermabond and tegaderm. Pt wanting to discharge today.    Bed  in locked and lowest position. Call light and personal possessions within reach. Patient educated about use of call light and verbalized understanding.

## 2020-10-06 NOTE — PROGRESS NOTES
Pt arrives @ ID lounge. Denies needs, c/o. Discharge instructions given to patient, he verbalizes understanding and states plans for follow-up. IV cathlon removed. All belongings accounted for, all questions answered at this time. Pt to take a cab to [place] and get her car and then drive; discussed and OK'ed this plan with Hospitalist. Cab called for pt. Pt helped out to cab. She denies further needs.

## 2020-10-06 NOTE — PROGRESS NOTES
"Pt is A&Ox4. VSS. Lungs clear diminished on 2LNC for SIMONE. Pt c/o pain 5/10 in the lap sites. Pt slowly tolerating diet, encourage pt to increase PO intake as possible. Pt is urinating well. Lap sites x4 to abd c/d/i with dermabond and Tegaderm with scant drainage to upper lap site. Pt states \"feeling better\" and is ambulating through the halls. Flu vaccine given per pt request. Bed low and locked, call light within reach, safety precautions in place, hourly rounding, WCM.  "

## 2020-10-12 ENCOUNTER — HOSPITAL ENCOUNTER (EMERGENCY)
Facility: MEDICAL CENTER | Age: 60
End: 2020-10-12
Payer: COMMERCIAL
